# Patient Record
Sex: MALE | Race: WHITE | Employment: FULL TIME | URBAN - METROPOLITAN AREA
[De-identification: names, ages, dates, MRNs, and addresses within clinical notes are randomized per-mention and may not be internally consistent; named-entity substitution may affect disease eponyms.]

---

## 2020-06-25 ENCOUNTER — TRANSFERRED RECORDS (OUTPATIENT)
Dept: HEALTH INFORMATION MANAGEMENT | Facility: CLINIC | Age: 50
End: 2020-06-25

## 2020-08-24 ENCOUNTER — TRANSFERRED RECORDS (OUTPATIENT)
Dept: HEALTH INFORMATION MANAGEMENT | Facility: CLINIC | Age: 50
End: 2020-08-24

## 2020-09-09 ENCOUNTER — TRANSFERRED RECORDS (OUTPATIENT)
Dept: HEALTH INFORMATION MANAGEMENT | Facility: CLINIC | Age: 50
End: 2020-09-09

## 2020-09-25 NOTE — TELEPHONE ENCOUNTER
RECORDS RECEIVED FROM: 2nd opinion L shoulder, self , no surgery, [facility] [place]   DATE RECEIVED: Oct 7, 2020     NOTES STATUS DETAILS   OFFICE NOTE from referring provider Received    OFFICE NOTE from other specialist In Process/Care Everywhere [facility]:  10/6 Called and LVM for [facility]    [facility] - [place]:  20 - ORTHO TV with STERLING Sandoval   DISCHARGE SUMMARY from hospital N/A    DISCHARGE REPORT from the ER N/A    OPERATIVE REPORT N/A    MEDICATION LIST Internal    IMPLANT RECORD/STICKER N/A    LABS     CBC/DIFF N/A    CULTURES N/A    INJECTIONS DONE IN RADIOLOGY N/A    MRI Internal/Received [facility] - MRI Shoulder   CT SCAN N/A    XRAYS (IMAGES & REPORTS) Internal/Received [facility] - XR Shoulder  20 - XR Shoulder    [facility]:  13 - XR Shoulder Left   TUMOR     PATHOLOGY  Slides & report N/A    20   3:15 PM   Records sent to scanning  Images in PACS  Complete  Stefany Gallardo CMA    Action 10/06/20 2:22 PM - [place]   Action Taken  Imaging received from  and attached to patient in PACS     Records Requested  10/06/20    Facility  [facility]; Phone: [phone]  [facility]; Phone:    Outcome * 10/6/20 2:33 PM called  for images to be pushed. Images received and attached to patient in PACs.  Called out and LVM with [facility] for records to be faxed to the clinic. - [place]

## 2020-10-07 ENCOUNTER — OFFICE VISIT (OUTPATIENT)
Dept: ORTHOPEDICS | Facility: CLINIC | Age: 50
End: 2020-10-07
Payer: COMMERCIAL

## 2020-10-07 ENCOUNTER — ANCILLARY PROCEDURE (OUTPATIENT)
Dept: GENERAL RADIOLOGY | Facility: CLINIC | Age: 50
End: 2020-10-07
Attending: ORTHOPAEDIC SURGERY
Payer: COMMERCIAL

## 2020-10-07 ENCOUNTER — PRE VISIT (OUTPATIENT)
Dept: ORTHOPEDICS | Facility: CLINIC | Age: 50
End: 2020-10-07

## 2020-10-07 VITALS — WEIGHT: 244.6 LBS | HEIGHT: 72 IN | BODY MASS INDEX: 33.13 KG/M2

## 2020-10-07 DIAGNOSIS — M71.9 DISORDER OF BURSAE AND TENDONS IN SHOULDER REGION: Primary | ICD-10-CM

## 2020-10-07 DIAGNOSIS — M25.512 LEFT SHOULDER PAIN, UNSPECIFIED CHRONICITY: ICD-10-CM

## 2020-10-07 DIAGNOSIS — M67.919 DISORDER OF BURSAE AND TENDONS IN SHOULDER REGION: Primary | ICD-10-CM

## 2020-10-07 DIAGNOSIS — M25.512 LEFT SHOULDER PAIN, UNSPECIFIED CHRONICITY: Primary | ICD-10-CM

## 2020-10-07 PROCEDURE — 73030 X-RAY EXAM OF SHOULDER: CPT | Mod: LT | Performed by: RADIOLOGY

## 2020-10-07 PROCEDURE — 99203 OFFICE O/P NEW LOW 30 MIN: CPT | Performed by: ORTHOPAEDIC SURGERY

## 2020-10-07 RX ORDER — LISINOPRIL/HYDROCHLOROTHIAZIDE 10-12.5 MG
1 TABLET ORAL
COMMUNITY
Start: 2020-08-04

## 2020-10-07 RX ORDER — MULTIVITAMIN WITH IRON
1 TABLET ORAL DAILY
COMMUNITY

## 2020-10-07 RX ORDER — GLIPIZIDE 5 MG/1
5 TABLET, FILM COATED, EXTENDED RELEASE ORAL
COMMUNITY

## 2020-10-07 RX ORDER — SIMVASTATIN 20 MG
20 TABLET ORAL
COMMUNITY
Start: 2020-08-04

## 2020-10-07 ASSESSMENT — MIFFLIN-ST. JEOR: SCORE: 2016.99

## 2020-10-07 NOTE — LETTER
10/7/2020         RE: Ranjeet Damico  [address] [address] [place] [address]        Dear Colleague,    Thank you for referring your patient, Ranjeet Damico, to the [facility] [place]. Please see a copy of my visit note below.    CHIEF CONCERN:  Left shoulder pain    HISTORY OF PRESENT ILLNESS:  Mr. Damico is a 50 year old man who is seen today for left shoulder pain. This started in March or April without traumatic event. He was seen in June at [facility] by a PA. He has also been seen at [facility]. Three months ago he had a subacromial injection which he recalls didn't help his pain even for an hour. Then, one month ago he had an injection at [facility] which he didn't think helped much either.     PAST MEDICAL HISTORY: Reviewed in EMR    Current Outpatient Medications   Medication Sig Dispense Refill     glipiZIDE (GLUCOTROL XL) 5 MG 24 hr tablet Take 5 mg by mouth       lisinopril-hydrochlorothiazide (ZESTORETIC) 10-12.5 MG tablet Take 1 tablet by mouth       magnesium 250 MG tablet Take 1 tablet by mouth daily       metFORMIN (GLUCOPHAGE) 1000 MG tablet Take 1,000 mg by mouth       Multiple Vitamins-Minerals (MENS MULTI VITAMIN & MINERAL PO)        simvastatin (ZOCOR) 20 MG tablet Take 20 mg by mouth            Allergies   Allergen Reactions     Bee Venom Hives     Also bee wax        Liquid Adhesive Itching and Rash     Silk tape ok  Silk tape ok  Swelling from plastic tapes after surgery   Silk tape ok         SOCIAL HISTORY:    Social History     Socioeconomic History     Marital status:      Spouse name: Not on file     Number of children: Not on file     Years of education: Not on file     Highest education level: Not on file   Occupational History     Not on file   Social Needs     Financial resource strain: Not on file     Food insecurity     Worry: Not on file     Inability: Not on file     Transportation needs     Medical: Not on file     Non-medical: Not on file    Tobacco Use     Smoking status: Never Smoker     Smokeless tobacco: Never Used   Substance and Sexual Activity     Alcohol use: Not on file     Drug use: Not on file     Sexual activity: Not on file   Lifestyle     Physical activity     Days per week: Not on file     Minutes per session: Not on file     Stress: Not on file   Relationships     Social connections     Talks on phone: Not on file     Gets together: Not on file     Attends Denominational service: Not on file     Active member of club or organization: Not on file     Attends meetings of clubs or organizations: Not on file     Relationship status: Not on file     Intimate partner violence     Fear of current or ex partner: Not on file     Emotionally abused: Not on file     Physically abused: Not on file     Forced sexual activity: Not on file   Other Topics Concern     Not on file   Social History Narrative     Not on file       FAMILY HISTORY: Reviewed in EMR      REVIEW OF SYSTEMS: Positive for that noted in past medical history and history of present illness and otherwise reviewed in EMR     PHYSICAL EXAM:    Adult male in no acute distress. Articulates and communicates with normal affect.  Respirations even and unlabored  Focused upper extremity exam: Skin intact. No erythema. Sensation intact all dermatomes into the hand to light touch. EPL, FPL, and Intrinsics intact. Right shoulder active motion is FE to 180, ER at side to 50, and IR to T12. Left shoulder active motion is FE to 160, ER to 40, and IR to L2.  Negative Neer and Brewster. No pain on palpation over the AC joint. Moderate pain on palpation over the long head of the biceps. Some discomfort with Speeds and OBriens. Neg Yergasons.     IMAGING:  Left shoulder XR today demonstrates relative normal preservation of the glenohumeral joint. No superior migration.     [facility] demonstrates the cuff tendons are intact without significant tear and normal muscle  without atrophy. There is some biceps tendinosis in the groove and a possible superior labral tear.    ASSESSMENT:    1. 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 He is rather uncomfortable and has had symptoms for nearly 6 months. I discussed the option of a biceps groove injection (or repeat GH injection) given the MR findings and exam today. We discussed the risks and potential benefits of the injection. He would like to pursue that. I would also add formal shoulder PT. He is closer to [place] so we will refer to [facility].  Patient should keep us apprised of his symptoms if not improving and return prn.     Yvrose Rodríguez MD      CHIEF CONCERN:  L shoulder pain      HISTORY OF PRESENT ILLNESS:  49 yo  RHD M PMHx T2DM presenting for a second opinion regarding L shoulder pain.The patient was initially seen by his general orthopedist, Dr. Le, at [facility] 2-3 months ago and given subacromial CSI. Provided no relief so was then scheduled for image guided CSI of GH joint 1 month ago which also failed to provide any relief. Dr. Le recommended physical therapy given lack of response to corticosteroids. Patient presents for second opinion regarding management options of shoulder pain.     Pain begin 6-8 months ago with no known inciting event. Describes shoulder pain as dull and constant. Interferes with sleep. Aggravated by overhead movements. Previous interventions include CSI injections and stretching, but has not yet done a dedicated PT ptorgma. Denies numbness or tingling of RUE. Denies previous hx of L shoulder pain or previous L shoulder surgery.    Does have orthopedic hx significant for R glenoid fracture years go and followed by Dr Le at [facility] who referred him to Dr. Allen.  Dr. Allen initially planned to treat non operatively but patient had persistent pain after trying PT. Eventually underwent RC  repair and capsular release which also did not seem to help with shoulder pain. Eventually, shoulder pain resolved on its own with no further surgical intervention.    PMHx (per care everywhere)  Kidney stone 09/2003     Diabetes type 2, controlled (HC) 1/2011     Hypertension       Hyperlipidemia       Fatty infiltration of liver         Surgical hx (per care everywhere)  CHOLECYSTECTOMY 2002        Hx Achilles tendon repair 2001        CERVICAL FUSION 1991   C4-C5     APPENDECTOMY 1989        hx shoulder arthroscopy 3/19/2014 Right right shoulder s/p arthroscopic capsular release, SAD on 3/19/2014     CERVICAL FUSION 11/11/03   C6-7     arm surg 3/2009 Right ulner shortened with screw repain     bone graph 7/2009 Right bone graft right arm     YAG LASER EYE PROCEDURE 4/2013        tooth implant 4/2013        CYSTOSCOPY 9/10/16   CYSTOSCOPY, BILATERAL STENT PLACEMENT, RETROGRADES     R shoulder RC repair  R shoulder capsular release      Current Outpatient Medications   Medication Sig Dispense Refill     glipiZIDE (GLUCOTROL XL) 5 MG 24 hr tablet Take 5 mg by mouth       lisinopril-hydrochlorothiazide (ZESTORETIC) 10-12.5 MG tablet Take 1 tablet by mouth       magnesium 250 MG tablet Take 1 tablet by mouth daily       metFORMIN (GLUCOPHAGE) 1000 MG tablet Take 1,000 mg by mouth       Multiple Vitamins-Minerals (MENS MULTI VITAMIN & MINERAL PO)        simvastatin (ZOCOR) 20 MG tablet Take 20 mg by mouth          Allergies   Allergen Reactions     Bee Venom Hives     Also bee wax        Liquid Adhesive Itching and Rash     Silk tape ok  Silk tape ok  Swelling from plastic tapes after surgery   Silk tape ok       SOCIAL HISTORY:    Tobacco: Denies  Alcohol: Social  Illicit drug use: Denies  Occupation:   Living situation: Lives with wife and daughter in home     FAMILY HISTORY: Reviewed in EMR      REVIEW OF SYSTEMS: Positive for that noted in past medical history and history of present illness  and otherwise reviewed in EMR     PHYSICAL EXAM:    Gen: Adult  in no acute distress. Articulates and communicates with normal affect.  Pulm: Respirations even and unlabored  CV: Extr wwp  MSK: Focused upper extremity exam:   Skin intact. No erythema. Sensation intact all dermatomes into the hand to light touch.      Left shoulder active motion is FE to 180, ER at side to 45 (same as contralateral), and IR to T7. No crepitance when passively ranging shoulder. No pain on palpation over the AC joint. Mild pain on palpation over the long head of the biceps.   Brewster  +  Speed's +  Yergason -   O'Briens -, patient had mild pain with pronation worsened with supination  Bear hug -  5/5 Strength with IR, ER, Abduction, Adduction  5/5 strength with deltoid, triceps, biceps, EPL, FPL, and Intrinsics.    IMAGING:  Plain films L shoulder largely unremarkable with intact GH joint space. MRI of L shoulder revealed some bicipital tendinosis and questionable superior labral tear.    ASSESSMENT:    50 M with L shoulder pain due to bicipital tendinosis and possible labral tear with no relief from previous subacromial and image guided glenohumeral corticosteroid injection. Discussed with patient at length regarding available treatment options including non operative management (anti inflammatories, topical pain medications, PT, image guided injection into bicipital groove) vs operative treatments (biceps tenodesis). Patient may benefit from injection directly into bicipital groove. While previous GH injection should have also also included intracapsular portion of biceps, may not have been sufficient dose to provide adequate anti inflammatory effects to biceps. We have some reservations about proceeding directly to biceps tenodesis given lack of response to GH injection. If bicipital groove CSI provides relief, would also help confirm etiology of pain and make biceps tenodesis more reasonable if pain remains  persistent.    PLAN:  Image guided bicipital groove corticosteroid injection   PT to evaluate and treat for biceps tendinosis  Can follow up with Dr Rodríguez if needed    Alcides Alfonso MD  Orthopaedic Surgery, PGY-1  Pager: [phone]

## 2020-10-07 NOTE — PROGRESS NOTES
CHIEF CONCERN:  L shoulder pain      HISTORY OF PRESENT ILLNESS:  51 yo  RHD M PMHx T2DM presenting for a second opinion regarding L shoulder pain.The patient was initially seen by his general orthopedist, Dr. Le, at [facility] 2-3 months ago and given subacromial CSI. Provided no relief so was then scheduled for image guided CSI of GH joint 1 month ago which also failed to provide any relief. Dr. Le recommended physical therapy given lack of response to corticosteroids. Patient presents for second opinion regarding management options of shoulder pain.     Pain begin 6-8 months ago with no known inciting event. Describes shoulder pain as dull and constant. Interferes with sleep. Aggravated by overhead movements. Previous interventions include CSI injections and stretching, but has not yet done a dedicated PT ptorgma. Denies numbness or tingling of RUE. Denies previous hx of L shoulder pain or previous L shoulder surgery.    Does have orthopedic hx significant for R glenoid fracture years go and followed by Dr Le at [facility] who referred him to Dr. Allen.  Dr. Allen initially planned to treat non operatively but patient had persistent pain after trying PT. Eventually underwent RC repair and capsular release which also did not seem to help with shoulder pain. Eventually, shoulder pain resolved on its own with no further surgical intervention.    PMHx (per care everywhere)  Kidney stone 09/2003     Diabetes type 2, controlled (HC) 1/2011     Hypertension       Hyperlipidemia       Fatty infiltration of liver         Surgical hx (per care everywhere)  CHOLECYSTECTOMY 2002        Hx Achilles tendon repair 2001        CERVICAL FUSION 1991   C4-C5     APPENDECTOMY 1989        hx shoulder arthroscopy 3/19/2014 Right right shoulder s/p arthroscopic capsular release, SAD on 3/19/2014     CERVICAL FUSION 11/11/03   C6-7     arm surg 3/2009 Right ulner shortened with screw repain     bone graph 7/2009 Right bone graft  right arm     YAG LASER EYE PROCEDURE 4/2013        tooth implant 4/2013        CYSTOSCOPY 9/10/16   CYSTOSCOPY, BILATERAL STENT PLACEMENT, RETROGRADES     R shoulder RC repair  R shoulder capsular release      Current Outpatient Medications   Medication Sig Dispense Refill     glipiZIDE (GLUCOTROL XL) 5 MG 24 hr tablet Take 5 mg by mouth       lisinopril-hydrochlorothiazide (ZESTORETIC) 10-12.5 MG tablet Take 1 tablet by mouth       magnesium 250 MG tablet Take 1 tablet by mouth daily       metFORMIN (GLUCOPHAGE) 1000 MG tablet Take 1,000 mg by mouth       Multiple Vitamins-Minerals (MENS MULTI VITAMIN & MINERAL PO)        simvastatin (ZOCOR) 20 MG tablet Take 20 mg by mouth          Allergies   Allergen Reactions     Bee Venom Hives     Also bee wax        Liquid Adhesive Itching and Rash     Silk tape ok  Silk tape ok  Swelling from plastic tapes after surgery   Silk tape ok       SOCIAL HISTORY:    Tobacco: Denies  Alcohol: Social  Illicit drug use: Denies  Occupation:   Living situation: Lives with wife and daughter in home     FAMILY HISTORY: Reviewed in EMR      REVIEW OF SYSTEMS: Positive for that noted in past medical history and history of present illness and otherwise reviewed in EMR     PHYSICAL EXAM:    Gen: Adult  in no acute distress. Articulates and communicates with normal affect.  Pulm: Respirations even and unlabored  CV: Extr wwp  MSK: Focused upper extremity exam:   Skin intact. No erythema. Sensation intact all dermatomes into the hand to light touch.      Left shoulder active motion is FE to 180, ER at side to 45 (same as contralateral), and IR to T7. No crepitance when passively ranging shoulder. No pain on palpation over the AC joint. Mild pain on palpation over the long head of the biceps.   Ney  +  Speed's +  Remigiorpablo -   JOSÉ'Brmartin -, patient had mild pain with pronation worsened with supination  Bear hug -  5/5 Strength with IR, ER, Abduction, Adduction  5/5  strength with deltoid, triceps, biceps, EPL, FPL, and Intrinsics.    IMAGING:  Plain films L shoulder largely unremarkable with intact GH joint space. MRI of L shoulder revealed some bicipital tendinosis and questionable superior labral tear.    ASSESSMENT:    50 M with L shoulder pain due to bicipital tendinosis and possible labral tear with no relief from previous subacromial and image guided glenohumeral corticosteroid injection. Discussed with patient at length regarding available treatment options including non operative management (anti inflammatories, topical pain medications, PT, image guided injection into bicipital groove) vs operative treatments (biceps tenodesis). Patient may benefit from injection directly into bicipital groove. While previous GH injection should have also also included intracapsular portion of biceps, may not have been sufficient dose to provide adequate anti inflammatory effects to biceps. We have some reservations about proceeding directly to biceps tenodesis given lack of response to GH injection. If bicipital groove CSI provides relief, would also help confirm etiology of pain and make biceps tenodesis more reasonable if pain remains persistent.    PLAN:  Image guided bicipital groove corticosteroid injection   PT to evaluate and treat for biceps tendinosis  Can follow up with Dr Rodríguez if needed    Alcides Alfonso MD  Orthopaedic Surgery, PGY-1  Pager: [phone]

## 2020-10-07 NOTE — NURSING NOTE
"Reason For Visit:   Chief Complaint   Patient presents with     Consult     Left shoulder pain.  2nd opinion saw Dr. Gomez.       PCP: Chata Luevano  Ref: self    ?  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  Currently working? Yes.  Work status?  Full time.  Date of injury: not that he can remember    Date of surgery: Note sure of what yyear  Type of surgery: Right shoulder rotator cuff repair and cuff release and a couple of other surgeries By Dr. Colt Raymundo.  Smoker: No  Request smoking cessation information: No    Right hand dominant    SANE score  Affected shoulder: Left  Right shoulder SANE: 100  Left shoulder SANE: 10-15    Ht 1.836 m (6' 0.28\")   Wt 110.9 kg (244 lb 9.6 oz)   BMI 32.91 kg/m        Pain Assessment  Patient Currently in Pain: Yes  0-10 Pain Scale: 5  Primary Pain Location: Shoulder(Left)  Pain Descriptors: Aching, Dull, Sharp  Alleviating Factors: Rest  Aggravating Factors: Movement    Gill Medrano ATC        "

## 2020-10-07 NOTE — PROGRESS NOTES
CHIEF CONCERN:  Left shoulder pain    HISTORY OF PRESENT ILLNESS:  Mr. Damico is a 50 year old man who is seen today for left shoulder pain. This started in March or April without traumatic event. He was seen in June at [facility] by a PA. He has also been seen at [facility]. Three months ago he had a subacromial injection which he recalls didn't help his pain even for an hour. Then, one month ago he had an injection at [facility] which he didn't think helped much either.     PAST MEDICAL HISTORY: Reviewed in EMR    Current Outpatient Medications   Medication Sig Dispense Refill     glipiZIDE (GLUCOTROL XL) 5 MG 24 hr tablet Take 5 mg by mouth       lisinopril-hydrochlorothiazide (ZESTORETIC) 10-12.5 MG tablet Take 1 tablet by mouth       magnesium 250 MG tablet Take 1 tablet by mouth daily       metFORMIN (GLUCOPHAGE) 1000 MG tablet Take 1,000 mg by mouth       Multiple Vitamins-Minerals (MENS MULTI VITAMIN & MINERAL PO)        simvastatin (ZOCOR) 20 MG tablet Take 20 mg by mouth            Allergies   Allergen Reactions     Bee Venom Hives     Also bee wax        Liquid Adhesive Itching and Rash     Silk tape ok  Silk tape ok  Swelling from plastic tapes after surgery   Silk tape ok         SOCIAL HISTORY:    Social History     Socioeconomic History     Marital status:      Spouse name: Not on file     Number of children: Not on file     Years of education: Not on file     Highest education level: Not on file   Occupational History     Not on file   Social Needs     Financial resource strain: Not on file     Food insecurity     Worry: Not on file     Inability: Not on file     Transportation needs     Medical: Not on file     Non-medical: Not on file   Tobacco Use     Smoking status: Never Smoker     Smokeless tobacco: Never Used   Substance and Sexual Activity     Alcohol use: Not on file     Drug use: Not on file     Sexual activity: Not on file   Lifestyle     Physical activity     Days per week: Not on file      Minutes per session: Not on file     Stress: Not on file   Relationships     Social connections     Talks on phone: Not on file     Gets together: Not on file     Attends Spiritism service: Not on file     Active member of club or organization: Not on file     Attends meetings of clubs or organizations: Not on file     Relationship status: Not on file     Intimate partner violence     Fear of current or ex partner: Not on file     Emotionally abused: Not on file     Physically abused: Not on file     Forced sexual activity: Not on file   Other Topics Concern     Not on file   Social History Narrative     Not on file       FAMILY HISTORY: Reviewed in EMR      REVIEW OF SYSTEMS: Positive for that noted in past medical history and history of present illness and otherwise reviewed in EMR     PHYSICAL EXAM:    Adult male in no acute distress. Articulates and communicates with normal affect.  Respirations even and unlabored  Focused upper extremity exam: Skin intact. No erythema. Sensation intact all dermatomes into the hand to light touch. EPL, FPL, and Intrinsics intact. Right shoulder active motion is FE to 180, ER at side to 50, and IR to T12. Left shoulder active motion is FE to 160, ER to 40, and IR to L2.  Negative Neer and Brewster. No pain on palpation over the AC joint. Moderate pain on palpation over the long head of the biceps. Some discomfort with Speeds and OBriens. Neg Yergasons.     IMAGING:  Left shoulder XR today demonstrates relative normal preservation of the glenohumeral joint. No superior migration.     [facility] demonstrates the cuff tendons are intact without significant tear and normal muscle without atrophy. There is some biceps tendinosis in the groove and a possible superior labral tear.    ASSESSMENT:    1. 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 He is  rather uncomfortable and has had symptoms for nearly 6 months. I discussed the option of a biceps groove injection (or repeat GH injection) given the MR findings and exam today. We discussed the risks and potential benefits of the injection. He would like to pursue that. I would also add formal shoulder PT. He is closer to [place] so we will refer to [facility].  Patient should keep us apprised of his symptoms if not improving and return prn.     Yvrose Rodríguez MD

## 2020-10-09 ENCOUNTER — TELEPHONE (OUTPATIENT)
Dept: ORTHOPEDICS | Facility: CLINIC | Age: 50
End: 2020-10-09

## 2020-10-09 ENCOUNTER — OFFICE VISIT (OUTPATIENT)
Dept: ORTHOPEDICS | Facility: CLINIC | Age: 50
End: 2020-10-09
Payer: COMMERCIAL

## 2020-10-09 VITALS — BODY MASS INDEX: 33.05 KG/M2 | WEIGHT: 244 LBS | HEIGHT: 72 IN

## 2020-10-09 DIAGNOSIS — M75.22 TENDINITIS OF UPPER BICEPS TENDON OF LEFT SHOULDER: Primary | ICD-10-CM

## 2020-10-09 PROCEDURE — 99207 PR DROP WITH A PROCEDURE: CPT | Performed by: FAMILY MEDICINE

## 2020-10-09 PROCEDURE — 20550 NJX 1 TENDON SHEATH/LIGAMENT: CPT | Mod: LT | Performed by: FAMILY MEDICINE

## 2020-10-09 PROCEDURE — 76942 ECHO GUIDE FOR BIOPSY: CPT | Performed by: FAMILY MEDICINE

## 2020-10-09 RX ORDER — TRIAMCINOLONE ACETONIDE 40 MG/ML
20 INJECTION, SUSPENSION INTRA-ARTICULAR; INTRAMUSCULAR
Status: SHIPPED | OUTPATIENT
Start: 2020-10-09

## 2020-10-09 RX ORDER — LIDOCAINE HYDROCHLORIDE 10 MG/ML
2 INJECTION, SOLUTION EPIDURAL; INFILTRATION; INTRACAUDAL; PERINEURAL
Status: SHIPPED | OUTPATIENT
Start: 2020-10-09

## 2020-10-09 RX ADMIN — LIDOCAINE HYDROCHLORIDE 2 ML: 10 INJECTION, SOLUTION EPIDURAL; INFILTRATION; INTRACAUDAL; PERINEURAL at 11:35

## 2020-10-09 RX ADMIN — TRIAMCINOLONE ACETONIDE 20 MG: 40 INJECTION, SUSPENSION INTRA-ARTICULAR; INTRAMUSCULAR at 11:35

## 2020-10-09 ASSESSMENT — MIFFLIN-ST. JEOR: SCORE: 2013.75

## 2020-10-09 NOTE — PROGRESS NOTES
SPORTS & ORTHOPEDIC WALK-IN VISIT 10/9/2020    Primary Care Physician: Dr. Luevano    Referred by Dr. Rodríguez for L biceps injection. Has had many in the past in subacromial and intra articular.    Most of the pain is located on the anterior aspect of his L shoulder.    Reason for visit:     What part of your body is injured / painful?  left shoulder    What caused the injury /pain? Unsure    How long ago did your injury occur or pain begin? several months ago    What are your most bothersome symptoms? Pain    How would you characterize your symptom?  aching and dull    What makes your symptoms better? Rest    What makes your symptoms worse? Movement    Have you been previously seen for this problem? Yes, Dr. Rodríguez    Medical History:    Any recent changes to your medical history? No    Any new medication prescribed since last visit? No    Have you had surgery on this body part before? No    Social History:    Occupation:     Handedness: Right    Exercise: softball, 4 wheeling, fishing    Review of Systems:    Do you have fever, chills, weight loss? No    Do you have any vision problems? No    Do you have any chest pain or edema? No    Do you have any shortness of breath or wheezing?  No    Do you have stomach problems? No    Do you have any numbness or focal weakness? No    Do you have diabetes? Yes, Type 2     Do you have problems with bleeding or clotting? No    Do you have an rashes or other skin lesions? No       Large Joint Injection/Arthocentesis    Date/Time: 10/9/2020 11:35 AM  Performed by: Jacobo Kendrick DO  Authorized by: Jacobo Kendrick DO     Indications:  Pain  Needle Size:  22 G  Guidance: ultrasound    Approach:  Anterior  Location:  Shoulder   Location comment:  L Biceps tendon        Medications:  20 mg triamcinolone 40 MG/ML; 2 mL lidocaine (PF) 1 %  Outcome:  Tolerated well, no immediate complications  Procedure discussed: discussed risks, benefits, and alternatives     Consent Given by:  Patient  Timeout: timeout called immediately prior to procedure    Prep: patient was prepped and draped in usual sterile fashion     Scribed by Darren Mendez ATC, ATC for Dr. Kendrick on 10/9/20 at 11:45AM, based on the provider's statements to me.

## 2020-10-09 NOTE — TELEPHONE ENCOUNTER
[facility] Call Center    Phone Message    May a detailed message be left on voicemail: yes     Reason for Call: Other: Pt of Dr. Harrison calling stating he saw Dr. Rodríguez on 10/07/20 and she recommended a certain PT and pt forgot who it was, he'd like a call back with this information please     Action Taken: Message routed to:  [facility] ([facility] Ortho    Travel Screening: Not Applicable

## 2020-10-09 NOTE — PROGRESS NOTES
"PROCEDURE ENCOUNTER    [facility]  Orthopedics  Jacobo Kendrick, DO  2020     Name: Ranjeet Damico  MRN: [id number]  Age: 50 year old  : 1970    Referring provider: Dr. Yvrose Rodríguez MD  Diagnosis: Proximal biceps tendinitis of left shoulder      Procedure:   Sheath of long head of proximal bicep tendon injection- Ultrasound Guided  The patient was informed of the risks and the benefits of the procedure and a written consent was signed.  The patient s left shoulder was prepped with chlorhexidine in sterile fashion.   20 mg of triamcinolone suspension was drawn up into a 3 mL syringe with 2 mL of 1% lidocaine.  Injection was performed using sterile technique.  Under ultrasound guidance a 1.5\" 22-gauge needle was used to enter the lateral aspect of left shoulder.  Needle placement was visualized and documented with ultrasound.  Ultrasound visualization required to ensure injection material enters the tendon sheath and not the tendon itself.  Injection performed short axis to the probe.  Injection solution visualized within the tendon sheath.  Images were permanently stored for the patient's record.  There were no complications. The patient tolerated the procedure well. There was negligible bleeding.   The patient was instructed to ice the wrist upon leaving clinic and refrain from overuse over the next 3 days.   The patient was instructed to call or go to the emergency room with any unusual pain, swelling, redness, or if otherwise concerned.  A follow up appointment will be scheduled to evaluate response to the injection, and to assess range of motion and pain.    "

## 2020-10-09 NOTE — TELEPHONE ENCOUNTER
Message was sent to Dr Rodríguez concerning PT orders and therapist recommendation at the [facility].  Patient was informed and agreeable with the plan.

## 2020-10-09 NOTE — NURSING NOTE
[facility] SPORTS MEDICINE  [address] [place] [id number]  Dept: [phone]  ______________________________________________________________________________    Patient: Ranjeet Damico   : 1970   MRN: [id number]   2020    INVASIVE PROCEDURE SAFETY CHECKLIST    Date: 10/9/20   Procedure:L biceps tendon CSI with kenalog US guided  Patient Name: Ranjeet Damico  MRN: [id number]  YOB: 1970    Action: Complete sections as appropriate. Any discrepancy results in a HARD COPY until resolved.     PRE PROCEDURE:  Patient ID verified with 2 identifiers (name and  or MRN): Yes  Procedure and site verified with patient/designee (when able): Yes  Accurate consent documentation in medical record: Yes  H&P (or appropriate assessment) documented in medical record: Yes  H&P must be up to 20 days prior to procedure and updates within 24 hours of procedure as applicable: NA  Relevant diagnostic and radiology test results appropriately labeled and displayed as applicable: Yes  Procedure site(s) marked with provider initials: NA    TIMEOUT:  Time-Out performed immediately prior to starting procedure, including verbal and active participation of all team members addressing the following:Yes  * Correct patient identify  * Confirmed that the correct side and site are marked  * An accurate procedure consent form  * Agreement on the procedure to be done  * Correct patient position  * Relevant images and results are properly labeled and appropriately displayed  * The need to administer antibiotics or fluids for irrigation purposes during the procedure as applicable   * Safety precautions based on patient history or medication use    DURING PROCEDURE: Verification of correct person, site, and procedures any time the responsibility for care of the patient is transferred to another member of the care team.       Prior to injection, verified patient identity using patient's name and date  of birth.  Due to injection administration, patient instructed to remain in clinic for 15 minutes  afterwards, and to report any adverse reaction to me immediately.    Tendon sheath injection was performed.     Drug Amount Wasted:  Yes: .5 mg/ml   Vial/Syringe: Single dose vial  Expiration Date:  6/22 - Jyotsna Mendez ATC  October 9, 2020

## 2020-10-09 NOTE — LETTER
"    10/9/2020         RE: Ranjeet Damico  [address] [facility] [address]        Dear Colleague,    Thank you for referring your patient, Ranjeet Damico, to the [facility]OPED[facility] [place]. Please see a copy of my visit note below.    PROCEDURE ENCOUNTER    [facility]  Orthopedics  Jacobo Kendrick, DO  2020     Name: Ranjeet Damico  MRN: [id number]  Age: 50 year old  : 1970    Referring provider: Dr. Yvrose Rodríguez MD  Diagnosis: Proximal biceps tendinitis of left shoulder      Procedure:   Sheath of long head of proximal bicep tendon injection- Ultrasound Guided  The patient was informed of the risks and the benefits of the procedure and a written consent was signed.  The patient s left shoulder was prepped with chlorhexidine in sterile fashion.   20 mg of triamcinolone suspension was drawn up into a 3 mL syringe with 2 mL of 1% lidocaine.  Injection was performed using sterile technique.  Under ultrasound guidance a 1.5\" 22-gauge needle was used to enter the lateral aspect of left shoulder.  Needle placement was visualized and documented with ultrasound.  Ultrasound visualization required to ensure injection material enters the tendon sheath and not the tendon itself.  Injection performed short axis to the probe.  Injection solution visualized within the tendon sheath.  Images were permanently stored for the patient's record.  There were no complications. The patient tolerated the procedure well. There was negligible bleeding.   The patient was instructed to ice the wrist upon leaving clinic and refrain from overuse over the next 3 days.   The patient was instructed to call or go to the emergency room with any unusual pain, swelling, redness, or if otherwise concerned.  A follow up appointment will be scheduled to evaluate response to the injection, and to assess range of motion and pain.            SPORTS & ORTHOPEDIC WALK-IN VISIT 10/9/2020    Primary Care " Physician: Dr. Luevano    Referred by Dr. Rodríguez for L biceps injection. Has had many in the past in subacromial and intra articular.    Most of the pain is located on the anterior aspect of his L shoulder.    Reason for visit:     What part of your body is injured / painful?  left shoulder    What caused the injury /pain? Unsure    How long ago did your injury occur or pain begin? several months ago    What are your most bothersome symptoms? Pain    How would you characterize your symptom?  aching and dull    What makes your symptoms better? Rest    What makes your symptoms worse? Movement    Have you been previously seen for this problem? Yes, Dr. Rodríguez    Medical History:    Any recent changes to your medical history? No    Any new medication prescribed since last visit? No    Have you had surgery on this body part before? No    Social History:    Occupation:     Handedness: Right    Exercise: softball, 4 wheeling, fishing    Review of Systems:    Do you have fever, chills, weight loss? No    Do you have any vision problems? No    Do you have any chest pain or edema? No    Do you have any shortness of breath or wheezing?  No    Do you have stomach problems? No    Do you have any numbness or focal weakness? No    Do you have diabetes? Yes, Type 2     Do you have problems with bleeding or clotting? No    Do you have an rashes or other skin lesions? No       Large Joint Injection/Arthocentesis    Date/Time: 10/9/2020 11:35 AM  Performed by: Jacobo Kendrick DO  Authorized by: Jacobo Kendrick DO     Indications:  Pain  Needle Size:  22 G  Guidance: ultrasound    Approach:  Anterior  Location:  Shoulder   Location comment:  L Biceps tendon        Medications:  20 mg triamcinolone 40 MG/ML; 2 mL lidocaine (PF) 1 %  Outcome:  Tolerated well, no immediate complications  Procedure discussed: discussed risks, benefits, and alternatives    Consent Given by:  Patient  Timeout: timeout called immediately  prior to procedure    Prep: patient was prepped and draped in usual sterile fashion     Scribed by Darren Mendez ATC, ATC for Dr. Kendrick on 10/9/20 at 11:45AM, based on the provider's statements to me.

## 2020-10-14 DIAGNOSIS — M25.512 LEFT SHOULDER PAIN, UNSPECIFIED CHRONICITY: Primary | ICD-10-CM

## 2020-10-20 ENCOUNTER — THERAPY VISIT (OUTPATIENT)
Dept: PHYSICAL THERAPY | Facility: CLINIC | Age: 50
End: 2020-10-20
Payer: COMMERCIAL

## 2020-10-20 DIAGNOSIS — G89.29 CHRONIC LEFT SHOULDER PAIN: ICD-10-CM

## 2020-10-20 DIAGNOSIS — M25.512 CHRONIC LEFT SHOULDER PAIN: ICD-10-CM

## 2020-10-20 PROCEDURE — 97112 NEUROMUSCULAR REEDUCATION: CPT | Mod: GP | Performed by: PHYSICAL THERAPIST

## 2020-10-20 PROCEDURE — 97530 THERAPEUTIC ACTIVITIES: CPT | Mod: GP | Performed by: PHYSICAL THERAPIST

## 2020-10-20 PROCEDURE — 97161 PT EVAL LOW COMPLEX 20 MIN: CPT | Mod: GP | Performed by: PHYSICAL THERAPIST

## 2020-10-20 NOTE — Clinical Note
Dr. Rodríguez,  I saw Timothy today in PT for his initial visit and he is doing well. The injection into his biceps tendon sheath was very effective at reducing his pain.  He did note it is starting to feel a little bit sore again but overall way better. I started him today on some scapular stabilization and pec stretching with the plan to progress to cuff strengthening-retraining down the road.  I'll let you know if anything goes the wrong direction on us.  Thanks.    Chaz Arita, DPT, SCS  [facility]

## 2020-10-20 NOTE — LETTER
[facility]  [address]  [place] [address]  [phone]    2020    Re: Ranjeet Damico   :   1970  MRN:  [id number]   REFERRING PHYSICIAN:   Yvrose Rodríguez    [facility] [place] [facility]    Date of Initial Evaluation:  10/20/2020  Visits:  Rxs Used: 1  Reason for Referral:  Chronic left shoulder pain    EVALUATION SUMMARY    [place] [facility] Initial [facility]  Subjective:  Ranjeet Damico is a 50 year old male with a left shoulder condition.    The condition occurred due to unknown reason.  The condition occurred with insidious onset.  This is a chronic condition.    Mechanism/History of injury/symptoms:  10/14/20 patient received MD orders for PT for left shoulder pain that has been ongoing for months without any specific incident he can recall. He had an ultrasound guided injection into his biceps tendon sheath on 10/9/20 and this was very helpful at reducing his pain  The pain is located anterior and the quality of pain is reported as sharp and achy.  The degree of pain is reported as 2-9/10. The timing of pain/symptoms is intermittent, worse during the day. Associated symptoms include: stiffness/loss of motion    Symptoms are exacerbated by: reaching overhead, out to side, and behind back, lifting or pushing with left arm.  Symptoms are relieved by: injection.  Since onset symptoms are unchanged until injection on 10/9/20, improving since then.    Special tests for this condition include: x-ray, mri.  Previous treatments for this condition include: injection x2 that did not help, last injection was helpful.    General health as reported by patient is fair.  Pertinent medical history includes: diabetes, high blood pressure, overweight.  Medical allergies include: adhesive.  Other surgeries include: many, 2 on right shoulder.  Current medications:  High blood pressure, metformin, glysinopril, glipizide, lysinopril    Current occupation: .  Patient's  home/work tasks include: computer work, prolonged sitting.  Patient is currently working in normal job without restrictions.    Potential barriers to physical therapy: none.  Red flags: none.    Previous level of function: unrestricted reaching, lifting, pushing/pulling with left arm without pain  Current functional restrictions:  Pain and limitation reaching, lifting, pushing/pulling with left arm    Objective:  SHOULDER:   PROM L PROM R AROM L AROM R MMT L MMT R   Flex Pain at 145  145 with pain 165 5-/5 5/5   Abd Pain at 135  145 with pain 165 4+/5 with pain 5/5   Full Can     5-/5 5/5   IR Pain at 40 degrees    5/5 5/5   ER 55  50 with pain 50 5-/5 5/5   Ext/IR   T11 with pain T9       Scapulothoraic Rhythm: left scapular dyskinesis noted with active flexion and abduction, rounded shoulder posture bilaterally    Palpation: tender over proximal biceps tendon in left shoulder    Special tests:   L R   Impingement     Neer's + -   Hawkin's-Nitin + -   Coracoid Impingement - -   Internal impingement - -   Labral     Hall's + for pain, no mechanical sx -   Biceps      Speed's - -   Rotator Cuff Tear     Drop Arm - -   Belly Press - -       Assessment/Plan:    Patient is a 50 year old male with left side shoulder complaints.    Patient has the following significant findings with corresponding treatment plan.                Diagnosis 1:  Left shoulder pain    Pain -  hot/cold therapy, US, manual therapy, self management, education and home program  Decreased ROM/flexibility - manual therapy, therapeutic exercise and home program  Decreased strength - therapeutic exercise, therapeutic activities and home program  Decreased proprioception - neuro re-education, therapeutic activities and home program  Decreased function - therapeutic activities and home program  Impaired posture - neuro re-education and home program    Therapy Evaluation Codes:   1) History comprised of:   Personal factors that impact the plan of  care:      None.    Comorbidity factors that impact the plan of care are:      Diabetes, High blood pressure and Overweight.     Medications impacting care: High blood pressure and diabetes meds.  2) Examination of Body Systems comprised of:   Body structures and functions that impact the plan of care:      Shoulder.   Activity limitations that impact the plan of care are:      Bathing, Cooking, Dressing and Lifting.  3) Clinical presentation characteristics are:   Stable/Uncomplicated.  4) Decision-Making    Low complexity using standardized patient assessment instrument and/or measureable assessment of functional outcome.  Cumulative Therapy Evaluation is: Low complexity.    Previous and current functional limitations:  (See Goal Flow Sheet for this information)    Short term and Long term goals: (See Goal Flow Sheet for this information)     Communication ability:  Patient appears to be able to clearly communicate and understand verbal and written communication and follow directions correctly.  Treatment Explanation - The following has been discussed with the patient:   RX ordered/plan of care  Anticipated outcomes  Possible risks and side effects  This patient would benefit from PT intervention to resume normal activities.   Rehab potential is good.    Frequency:  1 X week, once daily  Duration:  for 8 weeks  Discharge Plan:  Achieve all LTG.  Independent in home treatment program.  Reach maximal therapeutic benefit.            Thank you for your referral.    INQUIRIES  Therapist: Chaz Arita DPT, SCS  [facility]  [address]  [place] [address]  Phone: [phone]  Fax: [phone]

## 2020-10-20 NOTE — PROGRESS NOTES
[place] [facility] Initial Evaluation  Subjective:  Ranjeet Damico is a 50 year old male with a left shoulder condition.    The condition occurred due to unknown reason.  The condition occurred with insidious onset.  This is a chronic condition.    Mechanism/History of injury/symptoms:  10/14/20 patient received MD orders for PT for left shoulder pain that has been ongoing for months without any specific incident he can recall. He had an ultrasound guided injection into his biceps tendon sheath on 10/9/20 and this was very helpful at reducing his pain  The pain is located anterior and the quality of pain is reported as sharp and achy.  The degree of pain is reported as 2-9/10. The timing of pain/symptoms is intermittent, worse during the day. Associated symptoms include: stiffness/loss of motion    Symptoms are exacerbated by: reaching overhead, out to side, and behind back, lifting or pushing with left arm.  Symptoms are relieved by: injection.  Since onset symptoms are unchanged until injection on 10/9/20, improving since then.    Special tests for this condition include: x-ray, mri.  Previous treatments for this condition include: injection x2 that did not help, last injection was helpful.    General health as reported by patient is fair.  Pertinent medical history includes: diabetes, high blood pressure, overweight.  Medical allergies include: adhesive.  Other surgeries include: many, 2 on right shoulder.  Current medications:  High blood pressure, metformin, glysinopril, glipizide, lysinopril    Current occupation: .  Patient's home/work tasks include: computer work, prolonged sitting.  Patient is currently working in normal job without restrictions.    Potential barriers to physical therapy: none.  Red flags: none.    Previous level of function: unrestricted reaching, lifting, pushing/pulling with left arm without pain  Current functional restrictions:  Pain and limitation  reaching, lifting, pushing/pulling with left arm    HPI                    Objective:  SHOULDER:   PROM L PROM R AROM L AROM R MMT L MMT R   Flex Pain at 145  145 with pain 165 5-/5 5/5   Abd Pain at 135  145 with pain 165 4+/5 with pain 5/5   Full Can     5-/5 5/5   IR Pain at 40 degrees    5/5 5/5   ER 55  50 with pain 50 5-/5 5/5   Ext/IR   T11 with pain T9       Scapulothoraic Rhythm: left scapular dyskinesis noted with active flexion and abduction, rounded shoulder posture bilaterally    Palpation: tender over proximal biceps tendon in left shoulder    Special tests:   L R   Impingement     Neer's + -   Hawkin's-Nitin + -   Coracoid Impingement - -   Internal impingement - -   Labral     [place]'s + for pain, no mechanical sx -   Biceps      Speed's - -   Rotator Cuff Tear     Drop Arm - -   Belly Press - -           System    Physical Exam    General     ROS    Assessment/Plan:    Patient is a 50 year old male with left side shoulder complaints.    Patient has the following significant findings with corresponding treatment plan.                Diagnosis 1:  Left shoulder pain    Pain -  hot/cold therapy, US, manual therapy, self management, education and home program  Decreased ROM/flexibility - manual therapy, therapeutic exercise and home program  Decreased strength - therapeutic exercise, therapeutic activities and home program  Decreased proprioception - neuro re-education, therapeutic activities and home program  Decreased function - therapeutic activities and home program  Impaired posture - neuro re-education and home program    Therapy Evaluation Codes:   1) History comprised of:   Personal factors that impact the plan of care:      None.    Comorbidity factors that impact the plan of care are:      Diabetes, High blood pressure and Overweight.     Medications impacting care: High blood pressure and diabetes meds.  2) Examination of Body Systems comprised of:   Body structures and functions that  impact the plan of care:      Shoulder.   Activity limitations that impact the plan of care are:      Bathing, Cooking, Dressing and Lifting.  3) Clinical presentation characteristics are:   Stable/Uncomplicated.  4) Decision-Making    Low complexity using standardized patient assessment instrument and/or measureable assessment of functional outcome.  Cumulative Therapy Evaluation is: Low complexity.    Previous and current functional limitations:  (See Goal Flow Sheet for this information)    Short term and Long term goals: (See Goal Flow Sheet for this information)     Communication ability:  Patient appears to be able to clearly communicate and understand verbal and written communication and follow directions correctly.  Treatment Explanation - The following has been discussed with the patient:   RX ordered/plan of care  Anticipated outcomes  Possible risks and side effects  This patient would benefit from PT intervention to resume normal activities.   Rehab potential is good.    Frequency:  1 X week, once daily  Duration:  for 8 weeks  Discharge Plan:  Achieve all LTG.  Independent in home treatment program.  Reach maximal therapeutic benefit.    Please refer to the daily flowsheet for treatment today, total treatment time and time spent performing 1:1 timed codes.

## 2020-11-04 ENCOUNTER — THERAPY VISIT (OUTPATIENT)
Dept: PHYSICAL THERAPY | Facility: CLINIC | Age: 50
End: 2020-11-04
Payer: COMMERCIAL

## 2020-11-04 ENCOUNTER — TELEPHONE (OUTPATIENT)
Dept: ORTHOPEDICS | Facility: CLINIC | Age: 50
End: 2020-11-04

## 2020-11-04 DIAGNOSIS — G89.29 CHRONIC LEFT SHOULDER PAIN: ICD-10-CM

## 2020-11-04 DIAGNOSIS — M25.512 CHRONIC LEFT SHOULDER PAIN: ICD-10-CM

## 2020-11-04 PROCEDURE — 97010 HOT OR COLD PACKS THERAPY: CPT | Mod: GP | Performed by: PHYSICAL THERAPIST

## 2020-11-04 PROCEDURE — 97110 THERAPEUTIC EXERCISES: CPT | Mod: GP | Performed by: PHYSICAL THERAPIST

## 2020-11-04 PROCEDURE — 97112 NEUROMUSCULAR REEDUCATION: CPT | Mod: GP | Performed by: PHYSICAL THERAPIST

## 2020-11-04 NOTE — TELEPHONE ENCOUNTER
Patient was called back and a message was left.  He can be seen virtually if he is OK with this.  Dr. Rodríguez stated that this is OK.  Antoinette help him schedule a virtual appointment at his convenience

## 2020-11-11 ENCOUNTER — TELEPHONE (OUTPATIENT)
Dept: ORTHOPEDICS | Facility: CLINIC | Age: 50
End: 2020-11-11

## 2020-11-11 NOTE — TELEPHONE ENCOUNTER
Patient was called and a message was left.      Dr. Rodríguez is out sick today, 11/11/20, and we will need to cancel his appointment.  The clinic is waiting to hear of a plan for reschedule an we will reach out to him sometime today or tomorrow with the plan.  Our number was left for him to call back to confirm and cancel the appointment for today.

## 2020-11-12 ENCOUNTER — TELEPHONE (OUTPATIENT)
Dept: ORTHOPEDICS | Facility: CLINIC | Age: 50
End: 2020-11-12

## 2020-11-12 NOTE — TELEPHONE ENCOUNTER
Patient was called and a message as left to call back.      He can be scheduled tomorrow for a virtual visit with Dr. Rodríguez at 11:15am.  OK to overbook.  If that does not work he can be scheduled in the next available in clinic or virtual appointment.  Our number was left to call back.

## 2020-11-13 ENCOUNTER — VIRTUAL VISIT (OUTPATIENT)
Dept: ORTHOPEDICS | Facility: CLINIC | Age: 50
End: 2020-11-13
Payer: COMMERCIAL

## 2020-11-13 DIAGNOSIS — M67.922 BICEPS TENDINOPATHY, LEFT: Primary | ICD-10-CM

## 2020-11-13 PROCEDURE — 99213 OFFICE O/P EST LOW 20 MIN: CPT | Mod: 95 | Performed by: ORTHOPAEDIC SURGERY

## 2020-11-13 NOTE — PROGRESS NOTES
"Ranjeet Damico is a 50 year old male who is being evaluated via a billable video visit.      The patient has been notified of following:     \"This video visit will be conducted via a call between you and your physician/provider. We have found that certain health care needs can be provided without the need for an in-person physical exam.  This service lets us provide the care you need with a video conversation.  If a prescription is necessary we can send it directly to your pharmacy.  If lab work is needed we can place an order for that and you can then stop by our lab to have the test done at a later time.    Video visits are billed at different rates depending on your insurance coverage.  Please reach out to your insurance provider with any questions.    If during the course of the call the physician/provider feels a video visit is not appropriate, you will not be charged for this service.\"    Patient has given verbal consent for Video visit? Yes  How would you like to obtain your AVS? Mail a copy    Video Visit Technology for this patient: AmWell not working, patient has smart device, please try [employer] Video with patient      send text to [phone]    Will anyone else be joining your video visit? No      CHIEF CONCERN:  Left shoulder pain    HISTORY OF PRESENT ILLNESS:  Mr. Damico is a 50 year old gentleman who is following up today regarding his response to the biceps groove injection with Dr. Kendrick on 10/9/20. Patient states that he has had a number of cortisone injections in the past (had subacromial and GH injections) and those have never really worked for him but this latest injection was \"incredible\" and \"amazing.\" He states that his shoulder \"hadn't felt that good in years.\" He then started his PT and within a few visits his shoulder pain started to return. His shoulder is now back to affecting his sleep and he has pain reaching such as to put on a shirt.    ASSESSMENT:  1. 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 His exam on 10/7 was highly suspicious for biceps and groove pain and we did discuss previously whether a biceps tenodesis below the groove would be helpful. I think given his response to the groove injection this is a very reasonable option. He has had symptoms now for at least 3 months and has had physician directed treatment including multiple injections and formal therapy.   We discussed the risks, benefits, alternatives for the above named procedure. I discussed the post operative rehabilitation process/expectations, activity/work restrictions, and anticipated recovery time. We discussed the surgical risks including but not limited to risk of infection, bleeding, permanent injury to nerves and vessels. We discussed the risks of post operative weakness, stiffness, pain which is no better or worse, and/or need for additional procedures. The patient had the opportunity to ask questions and these were answered in lay language.  He would like to proceed with scheduling and we will assist him with that.        Yvrose Rodríguez MD    Video-Visit Details    Type of service:  Video Visit    Video Start Time: 11:31 AM  Video End Time: 11:44 AM    Originating Location (pt. Location): Home    Distant Location (provider location):  [facility]     Platform used for Video Visit: [employer]    Yvrose Rodríguez MD

## 2020-11-13 NOTE — LETTER
"    11/13/2020         RE: Ranjeet Damico  [id number] [address] [facility] [address]        Dear Colleague,    Thank you for referring your patient, Ranjeet Damico, to the [facility]. Please see a copy of my visit note below.    Ranjeet Damico is a 50 year old male who is being evaluated via a billable video visit.      The patient has been notified of following:     \"This video visit will be conducted via a call between you and your physician/provider. We have found that certain health care needs can be provided without the need for an in-person physical exam.  This service lets us provide the care you need with a video conversation.  If a prescription is necessary we can send it directly to your pharmacy.  If lab work is needed we can place an order for that and you can then stop by our lab to have the test done at a later time.    Video visits are billed at different rates depending on your insurance coverage.  Please reach out to your insurance provider with any questions.    If during the course of the call the physician/provider feels a video visit is not appropriate, you will not be charged for this service.\"    Patient has given verbal consent for Video visit? Yes  How would you like to obtain your AVS? Mail a copy    Video Visit Technology for this patient: AmWell not working, patient has smart device, please try [employer] Video with patient      send text to [phone]    Will anyone else be joining your video visit? No      CHIEF CONCERN:  Left shoulder pain    HISTORY OF PRESENT ILLNESS:  Mr. Damico is a 50 year old gentleman who is following up today regarding his response to the biceps groove injection with Dr. Kendrick on 10/9/20. Patient states that he has had a number of cortisone injections in the past (had subacromial and GH injections) and those have never really worked for him but this latest injection was \"incredible\" and \"amazing.\" He states that his shoulder " "\"hadn't felt that good in years.\" He then started his PT and within a few visits his shoulder pain started to return. His shoulder is now back to affecting his sleep and he has pain reaching such as to put on a shirt.    ASSESSMENT:  1. 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 His exam on 10/7 was highly suspicious for biceps and groove pain and we did discuss previously whether a biceps tenodesis below the groove would be helpful. I think given his response to the groove injection this is a very reasonable option. He has had symptoms now for at least 3 months and has had physician directed treatment including multiple injections and formal therapy.   We discussed the risks, benefits, alternatives for the above named procedure. I discussed the post operative rehabilitation process/expectations, activity/work restrictions, and anticipated recovery time. We discussed the surgical risks including but not limited to risk of infection, bleeding, permanent injury to nerves and vessels. We discussed the risks of post operative weakness, stiffness, pain which is no better or worse, and/or need for additional procedures. The patient had the opportunity to ask questions and these were answered in lay language.  He would like to proceed with scheduling and we will assist him with that.        Yvrose Rodríguez MD    Video-Visit Details    Type of service:  Video Visit    Video Start Time: 11:31 AM  Video End Time: 11:44 AM    Originating Location (pt. Location): Home    Distant Location (provider location):  [facility]     Platform used for Video Visit: [facility]    Yvrose Rodríguez MD  "

## 2020-11-13 NOTE — NURSING NOTE
Reason For Visit:   Chief Complaint   Patient presents with     RECHECK     Follow up left shoulder pain        PCP: Chata Luevano  Ref: self     ?  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  Currently working? Yes.  Work status?  Full time.  Date of injury: not that he can remember     Date of surgery: Note sure of what yyear  Type of surgery: Right shoulder rotator cuff repair and cuff release and a couple of other surgeries By Dr. Colt Raymundo.  Smoker: No  Request smoking cessation information: No     Right hand dominant    SANE score  Affected shoulder: Left  Right shoulder SANE: 100  Left shoulder SANE: 10-40    There were no vitals taken for this visit.      Pain Assessment  Patient Currently in Pain: Yes  0-10 Pain Scale: 8  Primary Pain Location: Shoulder(Left)  Pain Descriptors: Sharp  Aggravating Factors: Movement, Other (comment)(Overhead activities)    Gill Medrano ATC

## 2020-11-16 ENCOUNTER — TELEPHONE (OUTPATIENT)
Dept: ORTHOPEDICS | Facility: CLINIC | Age: 50
End: 2020-11-16

## 2020-11-16 DIAGNOSIS — Z11.59 ENCOUNTER FOR SCREENING FOR OTHER VIRAL DISEASES: Primary | ICD-10-CM

## 2020-11-16 PROBLEM — M67.922 BICEPS TENDINOPATHY, LEFT: Status: ACTIVE | Noted: 2020-11-16

## 2020-11-16 NOTE — TELEPHONE ENCOUNTER
Patient is scheduled for surgery with Dr. Rodríguez      Spoke or left message with: Spoke with Timothy    Date of Surgery: 12/8/20    Location: ASC    Informed patient they will need an adult  Yes    Pre-op with surgeon (if applicable): Complete    H&P: Patient to schedule with PCP    Additional imaging/appointments: Patient will await call from covid scheduling team    Surgery packet: Mailed 11/16/20     Additional comments: Patient will await call from pre op nurses 1-2 days prior to surgery for arrival time

## 2020-12-07 ENCOUNTER — TELEPHONE (OUTPATIENT)
Dept: ORTHOPEDICS | Facility: CLINIC | Age: 50
End: 2020-12-07

## 2020-12-07 NOTE — TELEPHONE ENCOUNTER
Patient is scheduled for a left shoulder arthroscopy, subacromial bursectomy, open biceps tenodesis 12/22/20.  Pre-op packet was mailed 11/16/20.  Patient has his COVID test and pre-op physical scheduled.  His spouse will provide transportation. All his questions were answered.  He will call if he has any concerns before the procedure.

## 2020-12-18 DIAGNOSIS — Z11.59 ENCOUNTER FOR SCREENING FOR OTHER VIRAL DISEASES: ICD-10-CM

## 2020-12-18 PROCEDURE — U0003 INFECTIOUS AGENT DETECTION BY NUCLEIC ACID (DNA OR RNA); SEVERE ACUTE RESPIRATORY SYNDROME CORONAVIRUS 2 (SARS-COV-2) (CORONAVIRUS DISEASE [COVID-19]), AMPLIFIED PROBE TECHNIQUE, MAKING USE OF HIGH THROUGHPUT TECHNOLOGIES AS DESCRIBED BY CMS-2020-01-R: HCPCS | Performed by: ORTHOPAEDIC SURGERY

## 2020-12-19 LAB
SARS-COV-2 RNA SPEC QL NAA+PROBE: NOT DETECTED
SPECIMEN SOURCE: NORMAL

## 2020-12-21 ENCOUNTER — ANESTHESIA EVENT (OUTPATIENT)
Dept: SURGERY | Facility: AMBULATORY SURGERY CENTER | Age: 50
End: 2020-12-21

## 2020-12-21 RX ORDER — NALOXONE HYDROCHLORIDE 0.4 MG/ML
0.4 INJECTION, SOLUTION INTRAMUSCULAR; INTRAVENOUS; SUBCUTANEOUS
Status: DISCONTINUED | OUTPATIENT
Start: 2020-12-21 | End: 2020-12-23 | Stop reason: HOSPADM

## 2020-12-21 RX ORDER — NALOXONE HYDROCHLORIDE 0.4 MG/ML
0.2 INJECTION, SOLUTION INTRAMUSCULAR; INTRAVENOUS; SUBCUTANEOUS
Status: DISCONTINUED | OUTPATIENT
Start: 2020-12-21 | End: 2020-12-23 | Stop reason: HOSPADM

## 2020-12-22 ENCOUNTER — ANESTHESIA (OUTPATIENT)
Dept: SURGERY | Facility: AMBULATORY SURGERY CENTER | Age: 50
End: 2020-12-22

## 2020-12-22 ENCOUNTER — HOSPITAL ENCOUNTER (OUTPATIENT)
Facility: AMBULATORY SURGERY CENTER | Age: 50
Discharge: HOME | End: 2020-12-22
Attending: ORTHOPAEDIC SURGERY | Admitting: ORTHOPAEDIC SURGERY
Payer: COMMERCIAL

## 2020-12-22 VITALS
HEART RATE: 61 BPM | HEIGHT: 73 IN | WEIGHT: 245 LBS | OXYGEN SATURATION: 96 % | RESPIRATION RATE: 14 BRPM | SYSTOLIC BLOOD PRESSURE: 114 MMHG | DIASTOLIC BLOOD PRESSURE: 80 MMHG | BODY MASS INDEX: 32.47 KG/M2 | TEMPERATURE: 97.1 F

## 2020-12-22 DIAGNOSIS — M67.922 BICEPS TENDINOPATHY, LEFT: Primary | ICD-10-CM

## 2020-12-22 LAB
GLUCOSE BLDC GLUCOMTR-MCNC: 163 MG/DL (ref 70–99)
GLUCOSE BLDC GLUCOMTR-MCNC: 180 MG/DL (ref 70–99)

## 2020-12-22 PROCEDURE — 23430 REPAIR BICEPS TENDON: CPT | Mod: AS | Performed by: PHYSICIAN ASSISTANT

## 2020-12-22 PROCEDURE — 23430 REPAIR BICEPS TENDON: CPT

## 2020-12-22 PROCEDURE — 29823 SHO ARTHRS SRG XTNSV DBRDMT: CPT | Mod: AS | Performed by: PHYSICIAN ASSISTANT

## 2020-12-22 PROCEDURE — 29823 SHO ARTHRS SRG XTNSV DBRDMT: CPT

## 2020-12-22 DEVICE — IMP ANCHOR ARTHREX BC SWVLK TENODESIS 6.25X19.1MM AR-1662BC: Type: IMPLANTABLE DEVICE | Site: SHOULDER | Status: FUNCTIONAL

## 2020-12-22 RX ORDER — BUPIVACAINE HYDROCHLORIDE 2.5 MG/ML
INJECTION, SOLUTION EPIDURAL; INFILTRATION; INTRACAUDAL PRN
Status: DISCONTINUED | OUTPATIENT
Start: 2020-12-22 | End: 2020-12-22 | Stop reason: HOSPADM

## 2020-12-22 RX ORDER — PROPOFOL 10 MG/ML
INJECTION, EMULSION INTRAVENOUS CONTINUOUS PRN
Status: DISCONTINUED | OUTPATIENT
Start: 2020-12-22 | End: 2020-12-22

## 2020-12-22 RX ORDER — FENTANYL CITRATE 50 UG/ML
25-50 INJECTION, SOLUTION INTRAMUSCULAR; INTRAVENOUS
Status: DISCONTINUED | OUTPATIENT
Start: 2020-12-22 | End: 2020-12-22 | Stop reason: HOSPADM

## 2020-12-22 RX ORDER — NALOXONE HYDROCHLORIDE 0.4 MG/ML
0.4 INJECTION, SOLUTION INTRAMUSCULAR; INTRAVENOUS; SUBCUTANEOUS
Status: DISCONTINUED | OUTPATIENT
Start: 2020-12-22 | End: 2020-12-23 | Stop reason: HOSPADM

## 2020-12-22 RX ORDER — ONDANSETRON 4 MG/1
4 TABLET, ORALLY DISINTEGRATING ORAL EVERY 30 MIN PRN
Status: DISCONTINUED | OUTPATIENT
Start: 2020-12-22 | End: 2020-12-23 | Stop reason: HOSPADM

## 2020-12-22 RX ORDER — GABAPENTIN 300 MG/1
300 CAPSULE ORAL ONCE
Status: COMPLETED | OUTPATIENT
Start: 2020-12-22 | End: 2020-12-22

## 2020-12-22 RX ORDER — AMOXICILLIN 250 MG
1-2 CAPSULE ORAL 2 TIMES DAILY
Qty: 30 TABLET | Refills: 0 | Status: SHIPPED | OUTPATIENT
Start: 2020-12-22

## 2020-12-22 RX ORDER — MEPERIDINE HYDROCHLORIDE 25 MG/ML
12.5 INJECTION INTRAMUSCULAR; INTRAVENOUS; SUBCUTANEOUS
Status: DISCONTINUED | OUTPATIENT
Start: 2020-12-22 | End: 2020-12-23 | Stop reason: HOSPADM

## 2020-12-22 RX ORDER — ACETAMINOPHEN 325 MG/1
650 TABLET ORAL EVERY 4 HOURS PRN
Qty: 50 TABLET | Refills: 0 | Status: SHIPPED | OUTPATIENT
Start: 2020-12-22

## 2020-12-22 RX ORDER — ONDANSETRON 2 MG/ML
4 INJECTION INTRAMUSCULAR; INTRAVENOUS EVERY 30 MIN PRN
Status: DISCONTINUED | OUTPATIENT
Start: 2020-12-22 | End: 2020-12-23 | Stop reason: HOSPADM

## 2020-12-22 RX ORDER — FENTANYL CITRATE 50 UG/ML
INJECTION, SOLUTION INTRAMUSCULAR; INTRAVENOUS PRN
Status: DISCONTINUED | OUTPATIENT
Start: 2020-12-22 | End: 2020-12-22

## 2020-12-22 RX ORDER — SODIUM CHLORIDE, SODIUM LACTATE, POTASSIUM CHLORIDE, CALCIUM CHLORIDE 600; 310; 30; 20 MG/100ML; MG/100ML; MG/100ML; MG/100ML
INJECTION, SOLUTION INTRAVENOUS CONTINUOUS
Status: DISCONTINUED | OUTPATIENT
Start: 2020-12-22 | End: 2020-12-23 | Stop reason: HOSPADM

## 2020-12-22 RX ORDER — CEFAZOLIN SODIUM 1 G/50ML
1 SOLUTION INTRAVENOUS SEE ADMIN INSTRUCTIONS
Status: DISCONTINUED | OUTPATIENT
Start: 2020-12-22 | End: 2020-12-22 | Stop reason: HOSPADM

## 2020-12-22 RX ORDER — ACETAMINOPHEN 325 MG/1
650 TABLET ORAL
Status: DISCONTINUED | OUTPATIENT
Start: 2020-12-22 | End: 2020-12-23 | Stop reason: HOSPADM

## 2020-12-22 RX ORDER — BUPIVACAINE HYDROCHLORIDE AND EPINEPHRINE 5; 5 MG/ML; UG/ML
INJECTION, SOLUTION PERINEURAL PRN
Status: DISCONTINUED | OUTPATIENT
Start: 2020-12-22 | End: 2020-12-22

## 2020-12-22 RX ORDER — ONDANSETRON 2 MG/ML
INJECTION INTRAMUSCULAR; INTRAVENOUS PRN
Status: DISCONTINUED | OUTPATIENT
Start: 2020-12-22 | End: 2020-12-22

## 2020-12-22 RX ORDER — OXYCODONE HYDROCHLORIDE 5 MG/1
5 TABLET ORAL
Status: DISCONTINUED | OUTPATIENT
Start: 2020-12-22 | End: 2020-12-23 | Stop reason: HOSPADM

## 2020-12-22 RX ORDER — NALOXONE HYDROCHLORIDE 0.4 MG/ML
0.2 INJECTION, SOLUTION INTRAMUSCULAR; INTRAVENOUS; SUBCUTANEOUS
Status: DISCONTINUED | OUTPATIENT
Start: 2020-12-22 | End: 2020-12-23 | Stop reason: HOSPADM

## 2020-12-22 RX ORDER — OXYCODONE HYDROCHLORIDE 5 MG/1
5-10 TABLET ORAL EVERY 4 HOURS PRN
Qty: 20 TABLET | Refills: 0 | Status: SHIPPED | OUTPATIENT
Start: 2020-12-22

## 2020-12-22 RX ORDER — ACETAMINOPHEN 325 MG/1
975 TABLET ORAL ONCE
Status: COMPLETED | OUTPATIENT
Start: 2020-12-22 | End: 2020-12-22

## 2020-12-22 RX ORDER — SODIUM CHLORIDE, SODIUM LACTATE, POTASSIUM CHLORIDE, CALCIUM CHLORIDE 600; 310; 30; 20 MG/100ML; MG/100ML; MG/100ML; MG/100ML
INJECTION, SOLUTION INTRAVENOUS CONTINUOUS PRN
Status: DISCONTINUED | OUTPATIENT
Start: 2020-12-22 | End: 2020-12-22

## 2020-12-22 RX ORDER — SODIUM CHLORIDE, SODIUM LACTATE, POTASSIUM CHLORIDE, CALCIUM CHLORIDE 600; 310; 30; 20 MG/100ML; MG/100ML; MG/100ML; MG/100ML
INJECTION, SOLUTION INTRAVENOUS CONTINUOUS
Status: DISCONTINUED | OUTPATIENT
Start: 2020-12-22 | End: 2020-12-22 | Stop reason: HOSPADM

## 2020-12-22 RX ORDER — KETOROLAC TROMETHAMINE 30 MG/ML
INJECTION, SOLUTION INTRAMUSCULAR; INTRAVENOUS PRN
Status: DISCONTINUED | OUTPATIENT
Start: 2020-12-22 | End: 2020-12-22

## 2020-12-22 RX ORDER — DEXAMETHASONE SODIUM PHOSPHATE 4 MG/ML
INJECTION, SOLUTION INTRA-ARTICULAR; INTRALESIONAL; INTRAMUSCULAR; INTRAVENOUS; SOFT TISSUE PRN
Status: DISCONTINUED | OUTPATIENT
Start: 2020-12-22 | End: 2020-12-22

## 2020-12-22 RX ORDER — CEFAZOLIN SODIUM 2 G/50ML
2 SOLUTION INTRAVENOUS
Status: COMPLETED | OUTPATIENT
Start: 2020-12-22 | End: 2020-12-22

## 2020-12-22 RX ORDER — IBUPROFEN 600 MG/1
600 TABLET, FILM COATED ORAL EVERY 6 HOURS PRN
Qty: 30 TABLET | Refills: 0 | Status: SHIPPED | OUTPATIENT
Start: 2020-12-22

## 2020-12-22 RX ORDER — FLUMAZENIL 0.1 MG/ML
0.2 INJECTION, SOLUTION INTRAVENOUS
Status: DISCONTINUED | OUTPATIENT
Start: 2020-12-22 | End: 2020-12-22 | Stop reason: HOSPADM

## 2020-12-22 RX ORDER — PROPOFOL 10 MG/ML
INJECTION, EMULSION INTRAVENOUS PRN
Status: DISCONTINUED | OUTPATIENT
Start: 2020-12-22 | End: 2020-12-22

## 2020-12-22 RX ADMIN — KETOROLAC TROMETHAMINE 30 MG: 30 INJECTION, SOLUTION INTRAMUSCULAR; INTRAVENOUS at 08:54

## 2020-12-22 RX ADMIN — BUPIVACAINE HYDROCHLORIDE AND EPINEPHRINE 10 ML: 5; 5 INJECTION, SOLUTION PERINEURAL at 07:35

## 2020-12-22 RX ADMIN — PROPOFOL 150 MCG/KG/MIN: 10 INJECTION, EMULSION INTRAVENOUS at 08:06

## 2020-12-22 RX ADMIN — ONDANSETRON 4 MG: 2 INJECTION INTRAMUSCULAR; INTRAVENOUS at 08:53

## 2020-12-22 RX ADMIN — SODIUM CHLORIDE, SODIUM LACTATE, POTASSIUM CHLORIDE, CALCIUM CHLORIDE: 600; 310; 30; 20 INJECTION, SOLUTION INTRAVENOUS at 07:40

## 2020-12-22 RX ADMIN — SODIUM CHLORIDE, SODIUM LACTATE, POTASSIUM CHLORIDE, CALCIUM CHLORIDE: 600; 310; 30; 20 INJECTION, SOLUTION INTRAVENOUS at 07:54

## 2020-12-22 RX ADMIN — PROPOFOL 200 MG: 10 INJECTION, EMULSION INTRAVENOUS at 08:04

## 2020-12-22 RX ADMIN — FENTANYL CITRATE 50 MCG: 50 INJECTION, SOLUTION INTRAMUSCULAR; INTRAVENOUS at 07:26

## 2020-12-22 RX ADMIN — GABAPENTIN 300 MG: 300 CAPSULE ORAL at 06:54

## 2020-12-22 RX ADMIN — FENTANYL CITRATE 50 MCG: 50 INJECTION, SOLUTION INTRAMUSCULAR; INTRAVENOUS at 08:22

## 2020-12-22 RX ADMIN — DEXAMETHASONE SODIUM PHOSPHATE 4 MG: 4 INJECTION, SOLUTION INTRA-ARTICULAR; INTRALESIONAL; INTRAMUSCULAR; INTRAVENOUS; SOFT TISSUE at 08:18

## 2020-12-22 RX ADMIN — CEFAZOLIN SODIUM 2 G: 2 SOLUTION INTRAVENOUS at 08:08

## 2020-12-22 RX ADMIN — ACETAMINOPHEN 975 MG: 325 TABLET ORAL at 06:54

## 2020-12-22 ASSESSMENT — LIFESTYLE VARIABLES: TOBACCO_USE: 0

## 2020-12-22 ASSESSMENT — MIFFLIN-ST. JEOR: SCORE: 2033.36

## 2020-12-22 NOTE — ANESTHESIA CARE TRANSFER NOTE
Patient: Ranjeet Damico    Procedure(s):  left shoulder arthroscopy, subacromial bursectomy, open biceps tenodesis    Diagnosis: Biceps tendinopathy, left [M67.922]  Diagnosis Additional Information: No value filed.    Anesthesia Type:   General, Peripheral Nerve Block, For Post-op pain in coordination with surgeon     Note:  Airway :Nasal Cannula  Patient transferred to:PACU  Handoff Report: Identifed the Patient, Identified the Reponsible Provider, Reviewed the pertinent medical history, Discussed the surgical course, Reviewed Intra-OP anesthesia mangement and issues during anesthesia, Set expectations for post-procedure period and Allowed opportunity for questions and acknowledgement of understanding      Vitals: (Last set prior to Anesthesia Care Transfer)    CRNA VITALS  12/22/2020 0841 - 12/22/2020 0918      12/22/2020             Resp Rate (set):  10                Electronically Signed By: TAMI Bueno CRNA  December 22, 2020  9:18 AM

## 2020-12-22 NOTE — OP NOTE
DATE OF PROCEDURE: 12/22/2020     PREOPERATIVE DIAGNOSES:   1. Left rotator cuff disease without tear  2. Left long head biceps disease.   3. Left subacromial bursitis.     POSTOPERATIVE DIAGNOSES:   1. Left rotator cuff tear without tear  2. Left long head biceps disease.  3. Left subacromial bursitis     PROCEDURES:   1. Left arthroscopic glenohumeral debridement, extensive  2. Left subacromial bursectomy without bony acromioplasty.   3. Left open biceps tenodesis    STAFF SURGEON: Yvrose Rodríguez MD   ASSISTANT: Jose Douglas PA-C   The assistance of Jose Douglas was needed for assistance with patient positioning and retraction in the absence of an available orthopaedic resident to assist.     ANESTHESIA: General endotracheal anesthesia with supplementary interscalene block.   ESTIMATED BLOOD LOSS: 5 mL.     IMPLANTS: Arthrex 6.25 Bio-SwiveLock  COMPLICATIONS: None.     BRIEF PATIENT HISTORY: Ranjeet Damico is a 50 year old male I have seen in clinic. Please refer to that documentation. He has had ongoing left shoulder pain which has not responded to PT an multiple injections.  Recent US guided biceps groove injection did relieve his pain. We discussed surgical treatment options including the risks and benefits of such. He wishes at this time to proceed with surgical intervention. We had discussed the risks and benefits of both non-operative and surgical management. We discussed the expected postoperative restrictions. We discussed the risks of surgery including risk of being no better or even worse if he  became stiff, infected, had an injury to the nerves or arteries which power the arm or hand or had a reaction to anesthesia. We discussed the postoperative rehabilitation course. The patient wished to proceed with surgery and informed consent was completed.    DESCRIPTION OF PROCEDURE: The patient was identified in the preoperative area and the correct left shoulder was marked for surgery. The patient was  provided an interscalene block by our Anesthesia colleagues and was taken to the operating room where he was surrendered to general endotracheal anesthesia. The patient was moved to the operating table in the beachchair position with all bony prominences well padded. The head was placed in a head shah with the neck in neutral position. The left upper extremity was prepped and draped in the usual sterile fashion. A timeout was held in accordance with hospital policy, confirming correct patient, side, site, procedure and administration of IV antibiotics prior to incision.   I initiated shoulder arthroscopy through a posterior viewing portal. I created an anterior working portal under direct visualization. I performed a diagnostic arthroscopy. There was fraying of the superior labrum but the anterior, inferior, and posterior labrum was intact. There was thin undersurface fraying of the anterior supra. The cuff was otherwise completely intact throughout. There were no loose bodies in the axillary pouch. The chondral surfaces were well preserved.  I tenotomized the long head of the biceps and allowed it to retract out of the shoulder. I debrided the stump back to a stable edge. I debrided the anterior, superior, and posterior labrum. I then moved to the subacromial space and performed a thorough subacromial bursectomy. Upon entering the subacromial space, rather significant bursitis was encountered. I debrided this with a shaver and an ablator. The bursal surface of the cuff was normal. I moved to the biceps tenodesis  I made a longitudinal incision near the axilla centered over the inferior edge of the pectoralis. I dissected down to the pec and came underneath the muscle where I identified the long head of the biceps. This tendon was delivered into the wound and starting at the musculotendinous junction I placed a stitch with FiberLoop extending 2 cm down the tendon. I removed the remaining proximal tendon. I then  exposed the site for tenodesis beneath the pectoralis below the biceps groove. I placed a augusta retractor and long thin rich retractor to safely expose the site. I drilled a 6.5 tunnel and used the 6.25 SwivelLock to deliver the tendon into the tunnel. I anchored the SwivelLock with good purchase and appropriate tension on the tendon.     All instruments were removed from the shoulder and then portals were closed with interrupted 3-0 Monocryl. Steri-Strips and a soft sterile dressing were applied. The arm was placed into an abduction sling and the patient was extubated and transported to the recovery room in stable condition. There were no apparent intraoperative complications.     POSTOPERATIVE PLAN:   1. The patient will be discharged to home when he meets Same Day discharge criteria.   2. The patient will have active hand, wrist and (supported) elbow range of motion.   3. At 2 weeks, the patient will start passive and active assisted range of motion of the shoulder and progress to active range of motion over the following 2-4 weeks with unrestricted strengthening at 2 months.     ERNA COX MD

## 2020-12-22 NOTE — BRIEF OP NOTE
[facility] [place]    Brief Operative Note    Pre-operative diagnosis: Biceps tendinopathy, left [M67.922]  Post-operative diagnosis Same as pre-operative diagnosis    Procedure: Procedure(s):  left shoulder arthroscopy, subacromial bursectomy, open biceps tenodesis  Surgeon: Surgeon(s) and Role:     * Yvrose Rodríguez MD - Primary     * Jose Douglas PA-C - Assisting  Anesthesia: General   Estimated blood loss: Less than 10 ml  Drains: None  Specimens: * No specimens in log *  Findings:   None.  Complications: None.  Implants:   Implant Name Type Inv. Item Serial No.  Lot No. LRB No. Used Action   IMP ANCHOR ARTHREX BC SWVLK TENODESIS 6.25X19.1MM [id number] Metallic Hardware/[place] IMP ANCHOR ARTHREX BC SWVLK TENODESIS 6.25X19.1MM [id number]  ARTHREX [id number] Left 1 Implanted         PLAN:  - Discharge home in stable condition  - Oxycodone, Tylenol, Ibuprofen for pain   - Shower/dressing change POD3  - NWB With LUE in sling at all times except PT and hygiene  - PT: Follow biceps tenodesis protocol.  Codmans, gentle elbow/hand/wrist ROM  - Follow up within 2 weeks

## 2020-12-22 NOTE — ANESTHESIA POSTPROCEDURE EVALUATION
Anesthesia POST Procedure Evaluation    Patient: Ranjeet Damico   MRN:     [id number] Gender:   male   Age:    50 year old :      1970        Preoperative Diagnosis: Biceps tendinopathy, left [M67.922]   Procedure(s):  left shoulder arthroscopy, subacromial bursectomy, open biceps tenodesis   Postop Comments: No value filed.     Anesthesia Type: General, Peripheral Nerve Block, For Post-op pain in coordination with surgeon       Disposition: Outpatient   Postop Pain Control: Uneventful            Sign Out: Well controlled pain   PONV: No   Neuro/Psych: Uneventful            Sign Out: Acceptable/Baseline neuro status   Airway/Respiratory: Uneventful            Sign Out: Acceptable/Baseline resp. status   CV/Hemodynamics: Uneventful            Sign Out: Acceptable CV status   Other NRE: NONE   DID A NON-ROUTINE EVENT OCCUR? No         Last Anesthesia Record Vitals:  CRNA VITALS  2020 0841 - 2020 0941      2020             Resp Rate (set):  10          Last PACU Vitals:  Vitals Value Taken Time   /55 20 0930   Temp 36.6  C (97.8  F) 20 0930   Pulse 71 20 0942   Resp 7 20 0942   SpO2 94 % 20 0942   Temp src     NIBP     Pulse     SpO2     Resp     Temp     Ht Rate     Temp 2     Vitals shown include unvalidated device data.      Electronically Signed By: Hugo Camp DO, 2020, 12:20 PM

## 2020-12-22 NOTE — ANESTHESIA PROCEDURE NOTES
Peripheral Nerve Block Procedure Note      Staff -   Anesthesiologist:  Hugo Camp DO  Performed By: anesthesiologist  Location: Pre-op  Procedure Start/Stop TImes:      12/22/2020 7:30 AM     12/22/2020 7:35 AM    patient identified, IV checked, site marked, risks and benefits discussed, informed consent, monitors and equipment checked, pre-op evaluation, at physician/surgeon's request and post-op pain management      Correct Patient: Yes      Correct Position: Yes      Correct Site: Yes      Correct Procedure: Yes      Correct Laterality:  Yes    Site Marked:  Yes  Procedure details:     Procedure:  Interscalene    ASA:  2    Diagnosis:  Post operative pain    Laterality:  Left    Position:  Supine    Sterile Prep: chloraprep, mask and sterile gloves      Local skin infiltration:  1% lidocaine    amount (mL):  2    Needle:  Insulated    Needle gauge:  21    Needle length (mm):  110    Ultrasound: Yes      Ultrasound used to identify targeted nerve, plexus, or vascular structure and placed a needle adjacent to it      Permanent Image entered into patiient's record      Abnormal pain on injection: No      Blood Aspirated: No      Paresthesias:  No    Bleeding at site: No      Bolus via:  Needle    Infusion Method:  Single Shot    Complications:  None  Assessment/Narrative:     Injection made incrementally with aspirations every (mL):  5     Informed consent obtained.  All risks and benefits of the nerve block discussed with the patient.  All questions answered and all parties agreed with the plan.   Block was placed at the surgeon's request for post operative pain control.    Exparel 133mg given in the block

## 2020-12-22 NOTE — OR NURSING
Patient received left side Interscalene nerve block  with Exparel.  Fentanyl 50mcg given. Tolerated procedure well.

## 2020-12-22 NOTE — DISCHARGE INSTRUCTIONS
[facility]  Home Care Following Anesthesia  For 24 hours after surgery:  1. Get plenty of rest.  A responsible adult must stay with you for at least 24 hours after you leave the surgery center.  2. Do not drive or use heavy equipment.  If you have weakness or tingling, don't drive or use heavy equipment until this feeling goes away.   3. Do not drink alcohol.   4. Avoid strenuous or risky activities.  Ask for help when climbing stairs.  5. You may feel lightheaded.  IF so, sit for a few minutes before standing.  Have someone help you get up.   6. If you have nausea (feel sick to your stomach): Drink only clear liquids such as apple juice, ginger ale, broth or 7-Up.  Rest may also help.  Be sure to drink enough fluids.  Move to a regular diet as you feel able.   7. You may have a slight fever.  Call the doctor if your fever is over 100 F (37.7 C) (taken under the tongue) or lasts longer than 24 hours.  8. You may have a dry mouth, a sore throat, muscle aches or trouble sleeping. These should go away after 24 hours.  9. Do not make important or legal decisions.               Tips for taking pain medications  To get the best pain relief possible, remember these points:    Take pain medications as directed, before pain becomes severe.    Pain medication can upset your stomach: taking it with food may help.    Constipation is a common side effect of pain medication. Drink plenty of  fluids.    Eat foods high in fiber. Take a stool softener if recommended by your doctor or pharmacist.    Do not drink alcohol, drive or operate machinery while taking pain medications.    Ask about other ways to control pain, such as with heat, ice or relaxation.    Tylenol/Acetaminophen Consumption  To help encourage the safe use of acetaminophen, the makers of TYLENOL  have lowered the maximum daily dose for single-ingredient Extra Strength TYLENOL  (acetaminophen) products sold in the [place] from 8  "pills per day (4,000 mg) to 6 pills per day (3,000 mg). The dosing interval has also changed from 2 pills every 4-6 hours to 2 pills every 6 hours.    If you feel your pain relief is insufficient, you may take Tylenol/Acetaminophen in addition to your narcotic pain medication.     Be careful not to exceed 3,000 mg of Tylenol/Acetaminophen in a 24 hour period from all sources.    If you are taking extra strength Tylenol/acetaminophen (500 mg), the maximum dose is 6 tablets in 24 hours.    If you are taking regular strength acetaminophen (325 mg), the maximum dose is 9 tablets in 24 hours.    Call a doctor for any of the followin. Signs of infection (fever, growing tenderness at the surgery site, a large amount of drainage or bleeding, severe pain, foul-smelling drainage, redness, swelling).  2. It has been over 8 to 10 hours since surgery and you are still not able to urinate (pass water).  3. Headache for over 24 hours.  4. Signs of Covid-19 infection (temperature over 100 degrees, shortness of breath, cough, loss of taste/smell, generalized body aches, persistent headache, chills, sore throat, nausea/vomiting/diarrhea)  Your doctor is:  Dr. Yvrose Rodríguez, Orthopaedics: [phone]             Or dial [phone] and ask for the resident on call for:  Orthopaedics  For emergency care, call the:  [facility]:  [phone] (TTY for hearing impaired: [phone])  Information about liposomal bupivacaine (Exparel)    What is Liposomal Bupivacaine?    Liposomal Bupivacaine is a numbing medication that can help you manage your pain after surgery.  This medication is similar to \"novacaine,\" which is often used by the dentist.  Liposomal bupivacaine is released slowly and can help control pain for up to 72 hours.    What is the purpose of Liposomal Bupivacaine?    To manage your pain after surgery    To help you sleep better, take deep breaths, walk more comfortable, and feel up to visiting " with others    How is the procedure done?    Liposomal bupivacaine is a medication given by an injection.    It is usually given right before your surgery.  If this is the case, you will be awake or sedated, but you should experience minimal pain during the procedure.    For some people, the injection may be given at the very end of your surgery.  It all depends on the type of surgery and your situation.    The procedure usually takes about 5-15 minutes.  An ultrasound machine will help the anesthesiologist insert it in the right place or the surgeon will inject it under direct vision.     A needle is used to place the numbing medication under your skin.  It provides pain relief by numbing the tissue in the area where your surgeon will make the incision.    What can I expect?    You may experience numbness, tingling, or a feeling of heaviness around the area that was injected.    If you experience any of the follow symptoms IMMEDIATELY CALL THE REGIONAL ANESTHESIA PAIN SERVICE:    Numbness or tingling occurs in areas other than around the injection site    Blurry vision    Ringing in your ears    A metallic taste in your mouth    PAGE: Dial [phone].  When prompted, enter the following 4-digit ID number:  [id number].  You will be prompted to enter your phone number; and then enter the # sign.  The clinician on call will call you back.    OR    CALL: Dial [phone].  Let the hospital  know that you are having a problem with a nerve block and that you would like to speak to the regional anesthesia pain service right away.    You should not receive any other type of numbing medication within 4 days after receiving liposomal bupivacaine unless your anesthesiologist approves.      Post Operative Instructions: Regional Anesthetic for Upper Extremity with Liposomal Bupivacaine  General Information:   Regional anesthesia is when local anesthetic or  numbing  medication is injected around the nerves to anesthetize  or  numb  the area supplied by that set of nerves. It is a type of analgesia used to control pain and decreases the need for narcotics following surgery.    Types of Regional Blocks:  Interscalene: A block injected into the neck on the operative shoulder/arm of a patient having shoulder surgery  Supraclavicular: A block injected near the clavicle on the operative shoulder of a patient having elbow, forearm, or hand surgery    Procedure:  The type of anesthesia your doctor used to numb your shoulder or arm will usually not start to wear off for 24-48 hours, but may last as long as 72 hours. You should be careful during that period, since it is possible to injure your arm without being aware of the injury. While your arm is numb, you should:    Avoid striking or bumping your arm    Avoid extreme hot or cold    Diet:  There are no restrictions on your diet. You should drink plenty of fluids.     Discomfort:  You will have a tingling and prickly sensation in your arm as the feeling begins to return. You can also expect some discomfort. The amount of discomfort is unpredictable, but if you have more pain than can be controlled with pain medication you should notify your physician.     Pain Medications:  Begin taking your oral pain pills before bedtime and during the night to avoid a sudden onset of pain as part of the block wears off.  Do not engage in drinking, driving, or hazardous occupations while taking pain medication.     Stitches:   You may have stitches or special skin closures. You doctor will inform you when to return to the office to have them removed.     Activity:  On the day of surgery you should try to stay in bed with your hand elevated on pillows. You may resume your normal activity after that, wearing a sling for comfort. Contact your physician if you have any of the problems:     Continued numbness or tingling in the arm or hand after 72 hours    Swelling of the fingers or fingers that are cold to the  touch    Excessive bleeding or drainage    Severe pain

## 2020-12-22 NOTE — ANESTHESIA PREPROCEDURE EVALUATION
"Anesthesia Pre-Procedure Evaluation    Patient: Ranjeet Damico   MRN:     [id number] Gender:   male   Age:    50 year old :      1970        Preoperative Diagnosis: Biceps tendinopathy, left [M67.922]   Procedure(s):  left shoulder arthroscopy, subacromial bursectomy, open biceps tenodesis     LABS:  CBC: No results found for: WBC, HGB, HCT, PLT  BMP: No results found for: NA, POTASSIUM, CHLORIDE, CO2, BUN, CR, GLC  COAGS: No results found for: PTT, INR, FIBR  POC: No results found for: BGM, HCG, HCGS  OTHER: No results found for: PH, LACT, A1C, ELIZABETH, PHOS, MAG, ALBUMIN, PROTTOTAL, ALT, AST, GGT, ALKPHOS, BILITOTAL, BILIDIRECT, LIPASE, AMYLASE, LINDA, TSH, T4, T3, CRP, SED     Preop Vitals    BP Readings from Last 3 Encounters:   20 131/89    Pulse Readings from Last 3 Encounters:   20 57      Resp Readings from Last 3 Encounters:   20 18    SpO2 Readings from Last 3 Encounters:   20 98%      Temp Readings from Last 1 Encounters:   20 36.6  C (97.8  F) (Temporal)    Ht Readings from Last 1 Encounters:   20 1.859 m (6' 1.2\")      Wt Readings from Last 1 Encounters:   20 111.1 kg (245 lb)    Estimated body mass index is 32.15 kg/m  as calculated from the following:    Height as of this encounter: 1.859 m (6' 1.2\").    Weight as of this encounter: 111.1 kg (245 lb).     LDA:  Supraglottic Airway (Active)   Number of days: 0        No past medical history on file.   History reviewed. No pertinent surgical history.   Allergies   Allergen Reactions     Bee Venom Hives     Also bee wax        Liquid Adhesive Itching and Rash     Silk tape ok  Silk tape ok  Swelling from plastic tapes after surgery   Silk tape ok          Anesthesia Evaluation     . Pt has had prior anesthetic.     No history of anesthetic complications          ROS/MED HX    ENT/Pulmonary:  - neg pulmonary ROS    (-) tobacco use   Neurologic:  - neg neurologic ROS     Cardiovascular:  - neg cardiovascular ROS "   (+) ----. : . . . :. . No previous cardiac testing       METS/Exercise Tolerance:  >4 METS   Hematologic:  - neg hematologic  ROS       Musculoskeletal:   (+)  other musculoskeletal- Chronic left shoulder pain      GI/Hepatic:  - neg GI/hepatic ROS       Renal/Genitourinary:  - ROS Renal section negative       Endo:  - neg endo ROS       Psychiatric:  - neg psychiatric ROS       Infectious Disease:  - neg infectious disease ROS       Malignancy:      - no malignancy   Other:    - neg other ROS                     PHYSICAL EXAM:   Mental Status/Neuro: A/A/O   Airway: Facies: Feasible  Mallampati: I  Mouth/Opening: Full  TM distance: > 6 cm  Neck ROM: Full   Respiratory: Auscultation: CTAB     Resp. Rate: Normal     Resp. Effort: Normal      CV: Rhythm: Regular  Rate: Age appropriate  Heart: Normal Sounds  Edema: None   Comments:      Dental: Normal Dentition                Assessment:   ASA SCORE: 1    H&P: History and physical reviewed and following examination; no interval change.   Smoking Status:  Non-Smoker/Unknown   NPO Status: NPO Appropriate     Plan:   Anes. Type:  General; Peripheral Nerve Block; For Post-op pain in coordination with surgeon     Block Details: Single Shot; Exparel; Interscalene   Pre-Medication: Acetaminophen; Gabapentin   Induction:  IV (Standard)   Airway: LMA   Access/Monitoring: PIV   Maintenance: Balanced     Postop Plan:   Postop Pain: Opioids  Postop Sedation/Airway: Not planned  Disposition: Outpatient     PONV Management:   Adult Risk Factors:, Non-Smoker, Postop Opioids   Prevention: Ondansetron, Dexamethasone     CONSENT: Direct conversation   Plan and risks discussed with: Patient   Blood Products: N/a                   Hugo Camp DO

## 2021-01-06 ENCOUNTER — OFFICE VISIT (OUTPATIENT)
Dept: ORTHOPEDICS | Facility: CLINIC | Age: 51
End: 2021-01-06
Payer: COMMERCIAL

## 2021-01-06 DIAGNOSIS — M67.922 BICEPS TENDINOPATHY, LEFT: Primary | ICD-10-CM

## 2021-01-06 PROCEDURE — 99024 POSTOP FOLLOW-UP VISIT: CPT | Performed by: ORTHOPAEDIC SURGERY

## 2021-01-06 NOTE — NURSING NOTE
Reason For Visit:   Chief Complaint   Patient presents with     Surgical Followup     2 week pop DOS 12/22/20  Left shoulder arthroscopy, subacromial bursectomy, open biceps tenodesis (Left)         PCP: Chata Luevano  Ref: self     ?  No  Occupation [employer]  at [employer].  Currently working? Yes.  Work status?  Full time.  Date of injury: not that he can remember     Date of surgery: Note sure of what yyear  Type of surgery: Right shoulder rotator cuff repair and cuff release and a couple of other surgeries By Dr. Colt Raymundo.  Date of surgery: 12/22/20  Type of surgery:   1. Left arthroscopic glenohumeral debridement, extensive  2. Left subacromial bursectomy without bony acromioplasty.   3. Left open biceps tenodesis  Smoker: No  Request smoking cessation information: No     Right hand dominant    SANE score  Affected shoulder: Left  Right shoulder SANE: 100  Left shoulder SANE: 0    There were no vitals taken for this visit.      Pain Assessment  Patient Currently in Pain: Brandan Medrano, ATC

## 2021-01-06 NOTE — LETTER
1/6/2021         RE: Ranjeet Damico  [address] [facility] [address]        Dear Colleague,    Thank you for referring your patient, Ranjeet Damico, to the [facility] [place]. Please see a copy of my visit note below.    CHIEF CONCERN: Status post left shoulder arthroscopy, subacromial bursectomy, open biceps tenodesis  DATE OF SURGERY: 12/22/20    HISTORY OF PRESENT ILLNESS: Mr. Damico is a pleasant 50 year-old man who is 2 weeks status post the above procedure. His postop pain is controlled. He denies new concerns today. His block lasted a good 4-5 days and wore off completely at that time.    EXAM:  Pleasant adult male in NAD  Respirations even and unlabored.  Left upper extremity: Incisions clean, dry, and intact. Distally neurovascularly intact without deficits. Shoulder range of motion - tolerates assisted FE to 100    ASSESSMENT:  1. Two weeks status post above procedure    PLAN:    Arthroscopic images and intra-operative findings reviewed    Range of Motion:     Hand, wrist and elbow ROM encouraged    Shoulder range of motion to progress. PT to start in [place]    Sling: On at all times except for bathing or dressing    Pain medication: Reviewed.NSAIDs OK.     Follow up: 4 weeks with no new radiographs needed.         Yvrose Rodríguez MD

## 2021-01-06 NOTE — PROGRESS NOTES
CHIEF CONCERN: Status post left shoulder arthroscopy, subacromial bursectomy, open biceps tenodesis  DATE OF SURGERY: 12/22/20    HISTORY OF PRESENT ILLNESS: Mr. Damico is a pleasant 50 year-old man who is 2 weeks status post the above procedure. His postop pain is controlled. He denies new concerns today. His block lasted a good 4-5 days and wore off completely at that time.    EXAM:  Pleasant adult male in NAD  Respirations even and unlabored.  Left upper extremity: Incisions clean, dry, and intact. Distally neurovascularly intact without deficits. Shoulder range of motion - tolerates assisted FE to 100    ASSESSMENT:  1. Two weeks status post above procedure    PLAN:    Arthroscopic images and intra-operative findings reviewed    Range of Motion:     Hand, wrist and elbow ROM encouraged    Shoulder range of motion to progress. PT to start in [place]    Sling: On at all times except for bathing or dressing    Pain medication: Reviewed.NSAIDs OK.     Follow up: 4 weeks with no new radiographs needed.

## 2021-01-15 ENCOUNTER — THERAPY VISIT (OUTPATIENT)
Dept: PHYSICAL THERAPY | Facility: CLINIC | Age: 51
End: 2021-01-15
Attending: ORTHOPAEDIC SURGERY
Payer: COMMERCIAL

## 2021-01-15 DIAGNOSIS — M67.922 BICEPS TENDINOPATHY, LEFT: ICD-10-CM

## 2021-01-15 DIAGNOSIS — M25.512 ACUTE PAIN OF LEFT SHOULDER: ICD-10-CM

## 2021-01-15 DIAGNOSIS — M67.814 BICEPS TENDONOSIS OF LEFT SHOULDER: ICD-10-CM

## 2021-01-15 PROCEDURE — 97110 THERAPEUTIC EXERCISES: CPT | Mod: GP | Performed by: PHYSICAL THERAPIST

## 2021-01-15 PROCEDURE — 97010 HOT OR COLD PACKS THERAPY: CPT | Mod: GP | Performed by: PHYSICAL THERAPIST

## 2021-01-15 PROCEDURE — 97161 PT EVAL LOW COMPLEX 20 MIN: CPT | Mod: GP | Performed by: PHYSICAL THERAPIST

## 2021-01-15 NOTE — PROGRESS NOTES
[facility] Initial Evaluation  Subjective:  Ranjeet Damico is a 50 year old male with a left shoulder condition.    The condition occurred due to surgery, with no known cause for initial pain.  The condition occurred with insidious onset prior to surgery.  This is a new condition.    Mechanism/History of injury/symptoms:  12/22/20 patient underwent left biceps tenodesis with sub-acromial synovectomy  The pain is located in the joint, anterior shoulder and the quality of pain is reported as aching or sharp.  The degree of pain is reported as 3/10. The timing of pain/symptoms is intermittent, not dependent on time of day. Associated symptoms include: loss of motion, loss of strength    Symptoms are exacerbated by: lifting or reaching with left arm, lying on left side.  Symptoms are relieved by: ice, sling, rest.  Since onset symptoms are gradually improving.    Special tests for this condition include: x-ray, MRI.  Previous treatments for this condition include: PT, surgery, injection.    General health as reported by patient is fair.  Pertinent medical history includes: diabetes, high blood pressure, overweight.  Medical allergies include: adhesive.  Other surgeries include, many, 2 on right shoulder:   Current medications:  high blood pressure, diabetes meds.    Current occupation: .  Patient's home/work tasks include: computer work, prolonged siting.  Patient is currently working in normal job without restrictions.    Potential barriers to physical therapy: none.  Red flags: none.    Previous level of function: able to reach, lift with left arm, able to lie on left arm  Current functional restrictions:  Unable to lie on left side or reach/lift with left arm    HPI                    Objective:  SHOULDER:   PROM L PROM R MMT L MMT R   Flex 110 170 nt 5/5   Abd 90 170 nt 5/5   Full Can   nt 5/5   IR 50 60 nt 5/5   ER 10 65 nt 5/5       Palpation: tenderness noted in left  anterior shoulder, incisional        System    Physical Exam    General     ROS    Assessment/Plan:    Patient is a 50 year old male with left side shoulder complaints.    Patient has the following significant findings with corresponding treatment plan.                Diagnosis 1:  S/p L shoulder biceps tenodesis, sub-acromial bursectomy    Pain -  hot/cold therapy, US, manual therapy, self management, education and home program  Decreased ROM/flexibility - manual therapy, therapeutic exercise and home program  Decreased strength - therapeutic exercise, therapeutic activities and home program  Decreased proprioception - neuro re-education, therapeutic activities and home program  Decreased function - therapeutic activities and home program    Therapy Evaluation Codes:   1) History comprised of:   Personal factors that impact the plan of care:      None.    Comorbidity factors that impact the plan of care are:      Diabetes, High blood pressure and Overweight.     Medications impacting care: High blood pressure and diabetes meds.  2) Examination of Body Systems comprised of:   Body structures and functions that impact the plan of care:      Shoulder.   Activity limitations that impact the plan of care are:      Bathing, Cooking, Driving, Dressing, Lifting, Reading/Computer work, Sports and Sleeping.  3) Clinical presentation characteristics are:   Stable/Uncomplicated.  4) Decision-Making    Low complexity using standardized patient assessment instrument and/or measureable assessment of functional outcome.  Cumulative Therapy Evaluation is: Low complexity.    Previous and current functional limitations:  (See Goal Flow Sheet for this information)    Short term and Long term goals: (See Goal Flow Sheet for this information)     Communication ability:  Patient appears to be able to clearly communicate and understand verbal and written communication and follow directions correctly.  Treatment Explanation - The following  has been discussed with the patient:   RX ordered/plan of care  Anticipated outcomes  Possible risks and side effects  This patient would benefit from PT intervention to resume normal activities.   Rehab potential is good.    Frequency:  1 X week, once daily  Duration:  for 12 weeks  Discharge Plan:  Achieve all LTG.  Independent in home treatment program.  Reach maximal therapeutic benefit.    Please refer to the daily flowsheet for treatment today, total treatment time and time spent performing 1:1 timed codes.

## 2021-01-29 ENCOUNTER — THERAPY VISIT (OUTPATIENT)
Dept: PHYSICAL THERAPY | Facility: CLINIC | Age: 51
End: 2021-01-29
Payer: COMMERCIAL

## 2021-01-29 DIAGNOSIS — M67.814 BICEPS TENDONOSIS OF LEFT SHOULDER: ICD-10-CM

## 2021-01-29 DIAGNOSIS — M25.512 ACUTE PAIN OF LEFT SHOULDER: ICD-10-CM

## 2021-01-29 PROCEDURE — 97110 THERAPEUTIC EXERCISES: CPT | Mod: GP | Performed by: PHYSICAL THERAPIST

## 2021-01-29 NOTE — PROGRESS NOTES
Subjective:  HPI  Physical Exam                    Objective:  System    Physical Exam    General     ROS    Assessment/Plan:    PROGRESS  REPORT    Progress reporting period is from 1/15/21 to 1/29/21.       SUBJECTIVE  Subjective changes noted by patient: Timothy reports his shoulder is doing well to this point.  It feels better than it did prior to his surgery in general, although he still has limitations in his motion, strength, and function.  He feels his HEP is going well.    Current pain level is 1/10.     Previous pain level was  3/10.   Changes in function:  Yes, see above.  Adverse reaction to treatment or activity: None    OBJECTIVE  Changes noted in objective findings:  Yes  Left shoulder AROM:    flexion 120-130    abduction 90-95     ER 45     IR/EXT to T12-L1    Right shoulder PROM:   flexion 140    abduction 140     ER 45     IR 45     ASSESSMENT/PLAN  Updated problem list and treatment plan: Diagnosis 1:  S/p Left shoulder biceps tenodesis with sub-acromial synovectomy    Pain -  hot/cold therapy, manual therapy, self management, education and home program  Decreased ROM/flexibility - manual therapy, therapeutic exercise and home program  Decreased strength - therapeutic exercise, therapeutic activities and home program  Decreased proprioception - neuro re-education, therapeutic activities and home program  Decreased function - therapeutic activities and home program  STG/LTGs have been met or progress has been made towards goals:  Yes (See Goal flow sheet completed today.)  Assessment of Progress: The patient's condition is improving.  The patient's condition has potential to improve.  Self Management Plans:  Patient has been instructed in a home treatment program.  Patient  has been instructed in self management of symptoms.    Ranjeet continues to require the following intervention to meet STG and LTG's:  PT    Recommendations:  This patient would benefit from continued therapy.     Frequency:  1 X  week, once daily  Duration:  for 4 weeks tapering to 2 X a month over 8 weeks        Please refer to the daily flowsheet for treatment today, total treatment time and time spent performing 1:1 timed codes.

## 2021-01-29 NOTE — Clinical Note
Dr. Rodríguez,  Timothy is doing well at this point 6 weeks s/p biceps tenodesis.  Pain is minimal and function is improving. Still some ROM restrictions but nothing unexpected. I progressed him to 4-quadrant stretching and supine/sidelying AROM exercises today.  He'll see you next Wednesday and follow up with me Friday.  Please let me know if you have any questions or concerns regarding Timothy's rehab.   Thank you.    Chaz Arita, OWEN, SCS

## 2021-02-03 ENCOUNTER — OFFICE VISIT (OUTPATIENT)
Dept: ORTHOPEDICS | Facility: CLINIC | Age: 51
End: 2021-02-03
Payer: COMMERCIAL

## 2021-02-03 DIAGNOSIS — M67.922 BICEPS TENDINOPATHY, LEFT: Primary | ICD-10-CM

## 2021-02-03 PROCEDURE — 99024 POSTOP FOLLOW-UP VISIT: CPT | Performed by: ORTHOPAEDIC SURGERY

## 2021-02-03 NOTE — PROGRESS NOTES
CHIEF CONCERN: Status post left shoulder arthroscopy, subacromial bursectomy, open biceps tenodesis  DATE OF SURGERY: 12/22/20    HISTORY OF PRESENT ILLNESS: Mr. Damico is a pleasant 50 year-old man who is 6 weeks status post the above procedure. His PT is progressing and he is improving. He still notes some weakness with mid-elevation activities.     EXAM:  Pleasant adult male in NAD  Respirations even and unlabored.  Left upper extremity: Incisions clean, dry, and intact. Distally neurovascularly intact without deficits. Shoulder range of motion active FE to 155., ER to 50    ASSESSMENT:  1. Six weeks status post above procedure    PLAN:    Range of Motion: Progress ROM and gentle strengthening of the shoulder with physical therapy per operative note.     Pain medication: NSAIDs and Tylenol PRN    Follow up: 6 weeks with no new radiographs needed.

## 2021-02-03 NOTE — LETTER
2/3/2021         RE: Ranjeet Damico  [id number] [facility] [address]        Dear Colleague,    Thank you for referring your patient, Ranjeet Damico, to the [facility] [place]. Please see a copy of my visit note below.    CHIEF CONCERN: Status post left shoulder arthroscopy, subacromial bursectomy, open biceps tenodesis  DATE OF SURGERY: 12/22/20    HISTORY OF PRESENT ILLNESS: Mr. Damico is a pleasant 50 year-old man who is 6 weeks status post the above procedure. His PT is progressing and he is improving. He still notes some weakness with mid-elevation activities.     EXAM:  Pleasant adult male in NAD  Respirations even and unlabored.  Left upper extremity: Incisions clean, dry, and intact. Distally neurovascularly intact without deficits. Shoulder range of motion active FE to 155., ER to 50    ASSESSMENT:  1. Six weeks status post above procedure    PLAN:    Range of Motion: Progress ROM and gentle strengthening of the shoulder with physical therapy per operative note.     Pain medication: NSAIDs and Tylenol PRN    Follow up: 6 weeks with no new radiographs needed.         Yvrose Rodríguez MD

## 2021-02-03 NOTE — NURSING NOTE
Reason For Visit:   Chief Complaint   Patient presents with     Surgical Followup     6 week pop DOS 12/22/20  Left shoulder arthroscopy, subacromial bursectomy, open biceps tenodesis (Left)         PCP: Chata Luevano  Ref: self     ?  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  Currently working? Yes.  Work status?  Full time.  Date of injury: not that he can remember     Date of surgery: Note sure of what yyear  Type of surgery: Right shoulder rotator cuff repair and cuff release and a couple of other surgeries By Dr. Colt Raymundo.  Date of surgery: 12/22/20  Type of surgery:   1. Left arthroscopic glenohumeral debridement, extensive  2. Left subacromial bursectomy without bony acromioplasty.   3. Left open biceps tenodesis  Smoker: No  Request smoking cessation information: No     Right hand dominant    SANE score  Affected shoulder: Left  Right shoulder SANE: 100  Left shoulder SANE: 25    There were no vitals taken for this visit.      Pain Assessment  Patient Currently in Pain: Denies(pain only if he goes to far or does too much)    Gill Medrano, ATC

## 2021-04-26 PROBLEM — M67.814 BICEPS TENDONOSIS OF LEFT SHOULDER: Status: RESOLVED | Noted: 2021-01-15 | Resolved: 2021-04-26

## 2021-04-26 PROBLEM — M25.512 ACUTE PAIN OF LEFT SHOULDER: Status: RESOLVED | Noted: 2021-01-15 | Resolved: 2021-04-26

## 2021-04-26 NOTE — PROGRESS NOTES
Patient is discharged from PT.  Please refer to progress note dated 1/29/21 for last known functional status as well as final objective/subjective values.

## 2021-06-01 ENCOUNTER — RECORDS - HEALTHEAST (OUTPATIENT)
Dept: ADMINISTRATIVE | Facility: CLINIC | Age: 51
End: 2021-06-01

## (undated) DEVICE — LINEN ORTHO PACK 5446

## (undated) DEVICE — BLADE KNIFE SURG 10 371110

## (undated) DEVICE — IMM KIT SHOULDER STABILIZATION 7210573

## (undated) DEVICE — NDL ECLIPSE 18GA 1.5"

## (undated) DEVICE — SOL NACL 0.9% IRRIG 3000ML BAG 2B7477

## (undated) DEVICE — SLING ARM LG 79-99157

## (undated) DEVICE — TUBING SET ARTHREX DUALWAVE OUTFLOW AR-6430

## (undated) DEVICE — GLOVE PROTEXIS POWDER FREE SMT 7.0  2D72PT70X

## (undated) DEVICE — DRAPE MAYO STAND 23X54 8337

## (undated) DEVICE — PREP DURAPREP 26ML APL 8630

## (undated) DEVICE — DRAPE STERI U 1015

## (undated) DEVICE — SU MONOCRYL 3-0 PS-1 27" Y936H

## (undated) DEVICE — PACK SHOULDER CUSTOM ASC SOP15ASUMA

## (undated) DEVICE — ESU CLEANER TIP 31142717

## (undated) DEVICE — SUCTION MANIFOLD NEPTUNE 2 SYS 4 PORT 0702-020-000

## (undated) DEVICE — SYR 10ML FINGER CONTROL W/O NDL 309695

## (undated) DEVICE — BRUSH SURGICAL SCRUB W/4% CHG SOL 25ML 371073

## (undated) DEVICE — DRAPE ARTHROSCOPY SHOULDER BEACHCHAIR 29369

## (undated) DEVICE — DRSG STERI STRIP 1/2X4" R1547

## (undated) DEVICE — SU NDL MCGOWAN 1/2 1859-6D

## (undated) DEVICE — BUR ARTHREX COOLCUT EXCALIBUR 4.0MMX13CM AR-8400EX

## (undated) DEVICE — GLOVE BIOGEL PI SZ 7.5 40875

## (undated) DEVICE — SOL WATER IRRIG 1000ML BOTTLE 2F7114

## (undated) DEVICE — BLADE KNIFE SURG 15 371115

## (undated) DEVICE — TUBING SYSTEM ARTHREX PATIENT REDEUCE AR-6421

## (undated) DEVICE — IMM KIT SHOULDER TMAX MASK FACE 7210559

## (undated) RX ORDER — EPINEPHRINE NASAL SOLUTION 1 MG/ML
SOLUTION NASAL
Status: DISPENSED
Start: 2020-12-22

## (undated) RX ORDER — CEFAZOLIN SODIUM 1 G/3ML
INJECTION, POWDER, FOR SOLUTION INTRAMUSCULAR; INTRAVENOUS
Status: DISPENSED
Start: 2020-12-22

## (undated) RX ORDER — FENTANYL CITRATE 50 UG/ML
INJECTION, SOLUTION INTRAMUSCULAR; INTRAVENOUS
Status: DISPENSED
Start: 2020-12-22

## (undated) RX ORDER — SIMETHICONE 40MG/0.6ML
SUSPENSION, DROPS(FINAL DOSAGE FORM)(ML) ORAL
Status: DISPENSED
Start: 2020-12-22

## (undated) RX ORDER — BUPIVACAINE HYDROCHLORIDE 2.5 MG/ML
INJECTION, SOLUTION EPIDURAL; INFILTRATION; INTRACAUDAL
Status: DISPENSED
Start: 2020-12-22

## (undated) RX ORDER — TRIAMCINOLONE ACETONIDE 40 MG/ML
INJECTION, SUSPENSION INTRA-ARTICULAR; INTRAMUSCULAR
Status: DISPENSED
Start: 2020-10-09

## (undated) RX ORDER — LIDOCAINE HYDROCHLORIDE 10 MG/ML
INJECTION, SOLUTION EPIDURAL; INFILTRATION; INTRACAUDAL; PERINEURAL
Status: DISPENSED
Start: 2020-10-09

## (undated) RX ORDER — ACETAMINOPHEN 325 MG/1
TABLET ORAL
Status: DISPENSED
Start: 2020-12-22

## (undated) RX ORDER — GABAPENTIN 300 MG/1
CAPSULE ORAL
Status: DISPENSED
Start: 2020-12-22